# Patient Record
Sex: MALE | Race: WHITE | NOT HISPANIC OR LATINO | Employment: OTHER | URBAN - METROPOLITAN AREA
[De-identification: names, ages, dates, MRNs, and addresses within clinical notes are randomized per-mention and may not be internally consistent; named-entity substitution may affect disease eponyms.]

---

## 2017-02-09 DIAGNOSIS — I10 ESSENTIAL HYPERTENSION: ICD-10-CM

## 2017-02-09 RX ORDER — HYDROCHLOROTHIAZIDE 25 MG/1
TABLET ORAL
Qty: 90 TABLET | Refills: 1 | Status: SHIPPED | OUTPATIENT
Start: 2017-02-09 | End: 2017-05-19 | Stop reason: SDUPTHER

## 2017-05-15 RX ORDER — AMLODIPINE BESYLATE AND BENAZEPRIL HYDROCHLORIDE 10; 20 MG/1; MG/1
CAPSULE ORAL
Qty: 30 CAPSULE | Refills: 0 | OUTPATIENT
Start: 2017-05-15

## 2017-05-19 ENCOUNTER — OFFICE VISIT (OUTPATIENT)
Dept: FAMILY MEDICINE CLINIC | Facility: CLINIC | Age: 62
End: 2017-05-19

## 2017-05-19 VITALS
DIASTOLIC BLOOD PRESSURE: 80 MMHG | HEART RATE: 89 BPM | OXYGEN SATURATION: 96 % | TEMPERATURE: 99.2 F | BODY MASS INDEX: 35.25 KG/M2 | HEIGHT: 74 IN | WEIGHT: 274.7 LBS | SYSTOLIC BLOOD PRESSURE: 116 MMHG

## 2017-05-19 DIAGNOSIS — I10 ESSENTIAL HYPERTENSION: Primary | ICD-10-CM

## 2017-05-19 DIAGNOSIS — Z00.00 HEALTH CARE MAINTENANCE: ICD-10-CM

## 2017-05-19 PROCEDURE — 99213 OFFICE O/P EST LOW 20 MIN: CPT | Performed by: FAMILY MEDICINE

## 2017-05-19 RX ORDER — HYDROCHLOROTHIAZIDE 25 MG/1
25 TABLET ORAL DAILY
Qty: 90 TABLET | Refills: 1 | Status: SHIPPED | OUTPATIENT
Start: 2017-05-19 | End: 2017-12-13 | Stop reason: SDUPTHER

## 2017-05-19 RX ORDER — AMLODIPINE BESYLATE AND BENAZEPRIL HYDROCHLORIDE 10; 20 MG/1; MG/1
1 CAPSULE ORAL DAILY
Qty: 90 CAPSULE | Refills: 1 | Status: SHIPPED | OUTPATIENT
Start: 2017-05-19 | End: 2017-12-13 | Stop reason: SDUPTHER

## 2017-05-24 ENCOUNTER — RESULTS ENCOUNTER (OUTPATIENT)
Dept: FAMILY MEDICINE CLINIC | Facility: CLINIC | Age: 62
End: 2017-05-24

## 2017-05-24 DIAGNOSIS — I10 ESSENTIAL HYPERTENSION: ICD-10-CM

## 2017-05-24 DIAGNOSIS — Z00.00 HEALTH CARE MAINTENANCE: ICD-10-CM

## 2017-10-24 ENCOUNTER — OFFICE VISIT (OUTPATIENT)
Dept: FAMILY MEDICINE CLINIC | Facility: CLINIC | Age: 62
End: 2017-10-24

## 2017-10-24 VITALS
DIASTOLIC BLOOD PRESSURE: 78 MMHG | HEART RATE: 96 BPM | SYSTOLIC BLOOD PRESSURE: 132 MMHG | WEIGHT: 277.6 LBS | HEIGHT: 74 IN | BODY MASS INDEX: 35.63 KG/M2 | TEMPERATURE: 98 F | OXYGEN SATURATION: 96 %

## 2017-10-24 DIAGNOSIS — H69.82 EUSTACHIAN TUBE DYSFUNCTION, LEFT: Primary | ICD-10-CM

## 2017-10-24 DIAGNOSIS — I10 ESSENTIAL HYPERTENSION: ICD-10-CM

## 2017-10-24 DIAGNOSIS — Z00.00 HEALTH CARE MAINTENANCE: ICD-10-CM

## 2017-10-24 PROCEDURE — 99213 OFFICE O/P EST LOW 20 MIN: CPT | Performed by: FAMILY MEDICINE

## 2017-10-24 NOTE — PROGRESS NOTES
"Subjective   Daryl Decker is a 61 y.o. male.     Chief Complaint   Patient presents with   • Ear Fullness     x 3days pt did not want the flu shot        History of Present Illness      Left ear fullness and pressure for 3 days.  Some sinus symptoms but not much.  No fever.  No ear pain.  No dizziness.  Earlier today the ear pressure equalized and he feels better.  He thought it might of the wax.  He is using peroxide drops in his ears.  No help.    Hypertension.  He's taking his blood pressure medicine.  Not checking it.  Weight is up a little bit.  Busy at work.    The following portions of the patient's history were reviewed and updated as appropriate: allergies, current medications, past family history, past medical history, past social history, past surgical history and problem list.          Review of Systems   Constitutional: Negative for fever.   HENT: Positive for congestion and hearing loss.    Respiratory: Negative.        Objective   Blood pressure 132/78, pulse 96, temperature 98 °F (36.7 °C), temperature source Oral, height 74\" (188 cm), weight 277 lb 9.6 oz (126 kg), SpO2 96 %.  Physical Exam   Constitutional: No distress.   No acute distress.  Nontoxic.   HENT:   Right Ear: Tympanic membrane, external ear and ear canal normal.   Left Ear: Tympanic membrane and ear canal normal.   Nose: Nose normal.   Mouth/Throat: Oropharynx is clear and moist. No oropharyngeal exudate.   Just minimal inflammation of the left external canal from the hydrogen peroxide.  No cerumen.  Eardrums unremarkable.   Eyes: Conjunctivae are normal. Right eye exhibits no discharge. Left eye exhibits no discharge. No scleral icterus.   Pulmonary/Chest: No stridor. No respiratory distress.   No tachypnea   Lymphadenopathy:     He has no cervical adenopathy.   Skin: No rash noted.   Nursing note and vitals reviewed.      Assessment/Plan   Daryl was seen today for ear fullness.    Diagnoses and all orders for this " visit:    Eustachian tube dysfunction, left    Essential hypertension  -     CBC & Differential; Future  -     Comprehensive Metabolic Panel; Future  -     Lipid Panel; Future    Health care maintenance  -     CBC & Differential; Future  -     Comprehensive Metabolic Panel; Future  -     Lipid Panel; Future      Eustachian tube dysfunction.  Resolved.  He's going to [place] in one month.  Call with recurrent symptoms.  To consider Flonase nasal spray over-the-counter with recurrent ear popping or pressure.  With fever worsening symptoms please call.    Hypertension.  We will see him back within about a month or 2 for complete physical examination.  Lab work ordered.

## 2017-10-29 ENCOUNTER — RESULTS ENCOUNTER (OUTPATIENT)
Dept: FAMILY MEDICINE CLINIC | Facility: CLINIC | Age: 62
End: 2017-10-29

## 2017-10-29 DIAGNOSIS — I10 ESSENTIAL HYPERTENSION: ICD-10-CM

## 2017-10-29 DIAGNOSIS — Z00.00 HEALTH CARE MAINTENANCE: ICD-10-CM

## 2017-11-16 LAB
ALBUMIN SERPL-MCNC: 4.6 G/DL (ref 3.5–5.2)
ALBUMIN/GLOB SERPL: 1.4 G/DL
ALP SERPL-CCNC: 67 U/L (ref 39–117)
ALT SERPL-CCNC: 27 U/L (ref 1–41)
AST SERPL-CCNC: 18 U/L (ref 1–40)
BASOPHILS # BLD AUTO: 0.03 10*3/MM3 (ref 0–0.2)
BASOPHILS NFR BLD AUTO: 0.4 % (ref 0–1.5)
BILIRUB SERPL-MCNC: 0.5 MG/DL (ref 0.1–1.2)
BUN SERPL-MCNC: 13 MG/DL (ref 8–23)
BUN/CREAT SERPL: 14.4 (ref 7–25)
CALCIUM SERPL-MCNC: 9.9 MG/DL (ref 8.6–10.5)
CHLORIDE SERPL-SCNC: 98 MMOL/L (ref 98–107)
CHOLEST SERPL-MCNC: 187 MG/DL (ref 0–200)
CO2 SERPL-SCNC: 28.5 MMOL/L (ref 22–29)
CREAT SERPL-MCNC: 0.9 MG/DL (ref 0.76–1.27)
EOSINOPHIL # BLD AUTO: 0.28 10*3/MM3 (ref 0–0.7)
EOSINOPHIL NFR BLD AUTO: 4 % (ref 0.3–6.2)
ERYTHROCYTE [DISTWIDTH] IN BLOOD BY AUTOMATED COUNT: 12.9 % (ref 11.5–14.5)
GFR SERPLBLD CREATININE-BSD FMLA CKD-EPI: 104 ML/MIN/1.73
GFR SERPLBLD CREATININE-BSD FMLA CKD-EPI: 86 ML/MIN/1.73
GLOBULIN SER CALC-MCNC: 3.2 GM/DL
GLUCOSE SERPL-MCNC: 91 MG/DL (ref 65–99)
HCT VFR BLD AUTO: 47.9 % (ref 40.4–52.2)
HDLC SERPL-MCNC: 42 MG/DL (ref 40–60)
HGB BLD-MCNC: 15.8 G/DL (ref 13.7–17.6)
IMM GRANULOCYTES # BLD: 0 10*3/MM3 (ref 0–0.03)
IMM GRANULOCYTES NFR BLD: 0 % (ref 0–0.5)
LDLC SERPL CALC-MCNC: 115 MG/DL (ref 0–100)
LYMPHOCYTES # BLD AUTO: 2.17 10*3/MM3 (ref 0.9–4.8)
LYMPHOCYTES NFR BLD AUTO: 31 % (ref 19.6–45.3)
MCH RBC QN AUTO: 32.1 PG (ref 27–32.7)
MCHC RBC AUTO-ENTMCNC: 33 G/DL (ref 32.6–36.4)
MCV RBC AUTO: 97.4 FL (ref 79.8–96.2)
MONOCYTES # BLD AUTO: 0.5 10*3/MM3 (ref 0.2–1.2)
MONOCYTES NFR BLD AUTO: 7.1 % (ref 5–12)
NEUTROPHILS # BLD AUTO: 4.02 10*3/MM3 (ref 1.9–8.1)
NEUTROPHILS NFR BLD AUTO: 57.5 % (ref 42.7–76)
PLATELET # BLD AUTO: 256 10*3/MM3 (ref 140–500)
POTASSIUM SERPL-SCNC: 4.3 MMOL/L (ref 3.5–5.2)
PROT SERPL-MCNC: 7.8 G/DL (ref 6–8.5)
RBC # BLD AUTO: 4.92 10*6/MM3 (ref 4.6–6)
SODIUM SERPL-SCNC: 141 MMOL/L (ref 136–145)
TRIGL SERPL-MCNC: 152 MG/DL (ref 0–150)
VLDLC SERPL CALC-MCNC: 30.4 MG/DL (ref 5–40)
WBC # BLD AUTO: 7 10*3/MM3 (ref 4.5–10.7)

## 2017-12-13 DIAGNOSIS — I10 ESSENTIAL HYPERTENSION: ICD-10-CM

## 2017-12-13 RX ORDER — AMLODIPINE BESYLATE AND BENAZEPRIL HYDROCHLORIDE 10; 20 MG/1; MG/1
CAPSULE ORAL
Qty: 90 CAPSULE | Refills: 1 | Status: SHIPPED | OUTPATIENT
Start: 2017-12-13 | End: 2018-06-22 | Stop reason: SDUPTHER

## 2017-12-13 RX ORDER — HYDROCHLOROTHIAZIDE 25 MG/1
TABLET ORAL
Qty: 90 TABLET | Refills: 1 | Status: SHIPPED | OUTPATIENT
Start: 2017-12-13 | End: 2018-06-22 | Stop reason: SDUPTHER

## 2017-12-18 ENCOUNTER — OFFICE VISIT (OUTPATIENT)
Dept: FAMILY MEDICINE CLINIC | Facility: CLINIC | Age: 62
End: 2017-12-18

## 2017-12-18 VITALS
TEMPERATURE: 98.2 F | HEIGHT: 74 IN | DIASTOLIC BLOOD PRESSURE: 72 MMHG | OXYGEN SATURATION: 95 % | WEIGHT: 269.9 LBS | SYSTOLIC BLOOD PRESSURE: 114 MMHG | BODY MASS INDEX: 34.64 KG/M2 | HEART RATE: 80 BPM

## 2017-12-18 DIAGNOSIS — E78.00 PURE HYPERCHOLESTEROLEMIA: ICD-10-CM

## 2017-12-18 DIAGNOSIS — K42.9 UMBILICAL HERNIA WITHOUT OBSTRUCTION AND WITHOUT GANGRENE: ICD-10-CM

## 2017-12-18 DIAGNOSIS — Z00.00 HEALTH CARE MAINTENANCE: Primary | ICD-10-CM

## 2017-12-18 DIAGNOSIS — I10 ESSENTIAL HYPERTENSION: ICD-10-CM

## 2017-12-18 PROBLEM — Z85.46 HISTORY OF PROSTATE CANCER: Status: ACTIVE | Noted: 2017-12-18

## 2017-12-18 PROCEDURE — 99396 PREV VISIT EST AGE 40-64: CPT | Performed by: FAMILY MEDICINE

## 2017-12-18 PROCEDURE — 90715 TDAP VACCINE 7 YRS/> IM: CPT | Performed by: FAMILY MEDICINE

## 2017-12-18 PROCEDURE — 90471 IMMUNIZATION ADMIN: CPT | Performed by: FAMILY MEDICINE

## 2017-12-18 NOTE — PROGRESS NOTES
Subjective   Daryl Decker is a 62 y.o. male.     Chief Complaint   Patient presents with   • Annual Exam     follow up labs   • URI     x nov 28        History of Present Illness     Hyperlipidemia. Total cholesterol is low but the 10 year risk based on 2013 guidelines of coronary vascular disease is 11%.  Patient has not been exercising as much.  Busy with tach season coming up.    Last lipid panel:   Lab Results   Component Value Date    HDL 42 11/15/2017     Lab Results   Component Value Date     (H) 11/15/2017     Lab Results   Component Value Date    TRIG 152 (H) 11/15/2017     Hypertension follow up. Doing well with current medication which he is taking as directed. No known high or low blood pressure episodes. No cardiovascular or neurological symptoms. Today's BP: 114/72 .    He has noticed some URI symptoms.  Went to an urgent care center few weeks go.  Overall it's much better.  He still has some sinus drainage but no fever.    Questionable bellybutton hernia.  He noticed something pop up about 3 or 4 months ago.  Not painful.  But noticeable.    Social History   Substance Use Topics   • Smoking status: Never Smoker   • Smokeless tobacco: Never Used   • Alcohol use Yes      Comment: occ     Family History   Problem Relation Age of Onset   • Cancer Mother    • Dementia Mother    • Thyroid disease Mother    • Cancer Father    • Aneurysm Father      femoral  artery.... and his brother   • Aortic aneurysm Paternal Grandmother      Ruptured     Immunization History   Administered Date(s) Administered   • Influenza, Quadrivalent 12/17/2015   • Tdap 12/18/2017     PHQ-2 Depression Screening  Little interest or pleasure in doing things? 0   Feeling down, depressed, or hopeless? 0   PHQ-2 Total Score 0           The following portions of the patient's history were reviewed and updated as appropriate: allergies, current medications, past family history, past medical history, past social history, past  "surgical history and problem list.          Review of Systems   Constitutional: Negative.    HENT: Negative.    Respiratory: Negative.    Cardiovascular: Negative.    Gastrointestinal: Positive for abdominal distention. Negative for abdominal pain.   Endocrine: Negative.    Genitourinary: Negative.    Musculoskeletal: Negative.    Skin: Negative.    Neurological: Negative.  Negative for headaches.   Psychiatric/Behavioral: Negative.    All other systems reviewed and are negative.      Objective   Blood pressure 114/72, pulse 80, temperature 98.2 °F (36.8 °C), temperature source Oral, height 188 cm (74.02\"), weight 122 kg (269 lb 14.4 oz), SpO2 95 %.  Physical Exam   Constitutional: He appears well-developed and well-nourished. No distress.   No acute distress.  Nontoxic.   HENT:   Right Ear: Tympanic membrane, external ear and ear canal normal.   Left Ear: Tympanic membrane, external ear and ear canal normal.   Nose: Nose normal.   Mouth/Throat: Oropharynx is clear and moist. No oropharyngeal exudate.   Eyes: Conjunctivae are normal. Right eye exhibits no discharge. Left eye exhibits no discharge. No scleral icterus.   Neck: No thyromegaly present.   Cardiovascular: Normal rate, regular rhythm, normal heart sounds and intact distal pulses.    Pulmonary/Chest: Effort normal and breath sounds normal. No stridor. No respiratory distress. He has no wheezes. He has no rales.   No tachypnea   Abdominal: Soft. Bowel sounds are normal. He exhibits no distension and no mass. There is no tenderness. There is no rebound and no guarding. A hernia is present.   Small to moderate sized palpable, reducible umbilical hernia.  Nontender.     Musculoskeletal: He exhibits no edema.   Lymphadenopathy:     He has no cervical adenopathy.   Skin: Skin is warm and dry. No rash noted.   Psychiatric: He has a normal mood and affect. His behavior is normal. Judgment and thought content normal.   Nursing note and vitals " reviewed.      Assessment/Plan   Daryl was seen today for annual exam and uri.    Diagnoses and all orders for this visit:    Health care maintenance    Essential hypertension    Umbilical hernia without obstruction and without gangrene  -     Ambulatory Referral to General Surgery    Pure hypercholesterolemia  -     Comprehensive Metabolic Panel; Future  -     Lipid Panel; Future    Other orders  -     aspirin 81 MG tablet; Take 1 tablet by mouth Daily.  -     Tdap Vaccine Greater Than or Equal To 8yo IM      Hyperlipidemia.  10 year risk of adverse chronic coronary vascular disease based on 2013 guidelines is 11%.  Total cholesterol is very low.  Mostly his issue is low HDL.  Recommend increase exercise.  Will reassess in 6 months to next    Hypertension.  Stable.    Here for complete physical examination today.    Immunizations.  TdaP today.    Umbilical hernia.  Referral for evaluation with general surgery.    Recommend low-dose aspirin day.    Prostate cancer screening not needed, history of prostate cancer followed by urology.  Next    Colonoscopy up-to-date.

## 2017-12-23 ENCOUNTER — RESULTS ENCOUNTER (OUTPATIENT)
Dept: FAMILY MEDICINE CLINIC | Facility: CLINIC | Age: 62
End: 2017-12-23

## 2017-12-23 DIAGNOSIS — E78.00 PURE HYPERCHOLESTEROLEMIA: ICD-10-CM

## 2018-01-15 ENCOUNTER — OFFICE VISIT (OUTPATIENT)
Dept: SURGERY | Facility: CLINIC | Age: 63
End: 2018-01-15

## 2018-01-15 VITALS — HEART RATE: 79 BPM | WEIGHT: 275 LBS | HEIGHT: 74 IN | OXYGEN SATURATION: 99 % | BODY MASS INDEX: 35.29 KG/M2

## 2018-01-15 DIAGNOSIS — K42.9 UMBILICAL HERNIA WITHOUT OBSTRUCTION AND WITHOUT GANGRENE: Primary | ICD-10-CM

## 2018-01-15 PROCEDURE — 99242 OFF/OP CONSLTJ NEW/EST SF 20: CPT | Performed by: SURGERY

## 2018-01-15 RX ORDER — SILDENAFIL CITRATE 20 MG/1
20 TABLET ORAL 3 TIMES DAILY
COMMUNITY
End: 2022-07-12

## 2018-05-16 LAB
ALBUMIN SERPL-MCNC: 4.5 G/DL (ref 3.5–5.2)
ALBUMIN/GLOB SERPL: 1.5 G/DL
ALP SERPL-CCNC: 71 U/L (ref 39–117)
ALT SERPL-CCNC: 24 U/L (ref 1–41)
AST SERPL-CCNC: 17 U/L (ref 1–40)
BILIRUB SERPL-MCNC: 0.6 MG/DL (ref 0.1–1.2)
BUN SERPL-MCNC: 14 MG/DL (ref 8–23)
BUN/CREAT SERPL: 12.5 (ref 7–25)
CALCIUM SERPL-MCNC: 9.5 MG/DL (ref 8.6–10.5)
CHLORIDE SERPL-SCNC: 99 MMOL/L (ref 98–107)
CHOLEST SERPL-MCNC: 188 MG/DL (ref 0–200)
CO2 SERPL-SCNC: 28.1 MMOL/L (ref 22–29)
CREAT SERPL-MCNC: 1.12 MG/DL (ref 0.76–1.27)
GFR SERPLBLD CREATININE-BSD FMLA CKD-EPI: 66 ML/MIN/1.73
GFR SERPLBLD CREATININE-BSD FMLA CKD-EPI: 80 ML/MIN/1.73
GLOBULIN SER CALC-MCNC: 3.1 GM/DL
GLUCOSE SERPL-MCNC: 130 MG/DL (ref 65–99)
HDLC SERPL-MCNC: 38 MG/DL (ref 40–60)
LDLC SERPL CALC-MCNC: 119 MG/DL (ref 0–100)
POTASSIUM SERPL-SCNC: 4.4 MMOL/L (ref 3.5–5.2)
PROT SERPL-MCNC: 7.6 G/DL (ref 6–8.5)
SODIUM SERPL-SCNC: 139 MMOL/L (ref 136–145)
TRIGL SERPL-MCNC: 157 MG/DL (ref 0–150)
VLDLC SERPL CALC-MCNC: 31.4 MG/DL (ref 5–40)

## 2018-05-21 ENCOUNTER — OFFICE VISIT (OUTPATIENT)
Dept: FAMILY MEDICINE CLINIC | Facility: CLINIC | Age: 63
End: 2018-05-21

## 2018-05-21 VITALS
HEIGHT: 74 IN | HEART RATE: 93 BPM | WEIGHT: 278 LBS | DIASTOLIC BLOOD PRESSURE: 62 MMHG | SYSTOLIC BLOOD PRESSURE: 110 MMHG | BODY MASS INDEX: 35.68 KG/M2 | TEMPERATURE: 98.5 F | OXYGEN SATURATION: 95 %

## 2018-05-21 DIAGNOSIS — E78.00 PURE HYPERCHOLESTEROLEMIA: ICD-10-CM

## 2018-05-21 DIAGNOSIS — R73.01 IMPAIRED FASTING GLUCOSE: ICD-10-CM

## 2018-05-21 DIAGNOSIS — I10 ESSENTIAL HYPERTENSION: Primary | ICD-10-CM

## 2018-05-21 PROCEDURE — 99213 OFFICE O/P EST LOW 20 MIN: CPT | Performed by: FAMILY MEDICINE

## 2018-05-21 NOTE — PROGRESS NOTES
"Subjective   Daryl Decker is a 62 y.o. male.     Hypertension (Follow up labs) and Knee Pain (twisted right knee, talk about shingles and hep a vaccine)    History of Present Illness    Hypertension follow up. Doing well with current medication which he is taking as directed. No known high or low blood pressure episodes. No cardiovascular or neurological symptoms. Today's BP: 110/62.      Hyperlipidemia follow up.  10 year risk of atherosclerotic coronary vascular disease based on 2013 guidelines is still 11%.  Patient states he is in the process of losing weight.  He states he's lost 9 pounds in the last 20 days her diet and exercise.  He wants to hold off cholesterol medication.  He is concerned about long-term side effects.    Last lipid panel:   Lab Results   Component Value Date    HDL 38 (L) 05/16/2018     Lab Results   Component Value Date     (H) 05/16/2018     Lab Results   Component Value Date    TRIG 157 (H) 05/16/2018     Impaired fasting glucose.  Previously normal.  Now 130.  He states he has been losing weight as above.    The following portions of the patient's history were reviewed and updated as appropriate: allergies, current medications, past family history, past medical history, past social history, past surgical history and problem list.      Review of Systems   Constitutional: Negative.    Respiratory: Negative.    Cardiovascular: Negative.    Musculoskeletal: Negative.        Objective   Blood pressure 110/62, pulse 93, temperature 98.5 °F (36.9 °C), temperature source Oral, height 188 cm (74.02\"), weight 126 kg (278 lb), SpO2 95 %.  Physical Exam   Constitutional: He appears well-developed and well-nourished. No distress.   Neck: No thyromegaly present.   Cardiovascular: Normal rate, regular rhythm, normal heart sounds and intact distal pulses.    Pulmonary/Chest: Effort normal and breath sounds normal.   Musculoskeletal: He exhibits no edema.   Skin: Skin is warm and dry. "   Psychiatric: He has a normal mood and affect. His behavior is normal. Judgment and thought content normal.   Nursing note and vitals reviewed.      Assessment/Plan   Daryl was seen today for hypertension and knee pain.    Diagnoses and all orders for this visit:    Essential hypertension    Pure hypercholesterolemia  -     Comprehensive Metabolic Panel; Future  -     Lipid Panel; Future    Impaired fasting glucose  -     Hemoglobin A1c; Future      Hypertension.  Well-controlled.  Continue current medication.      Hyperlipidemia.  10 year risk continues to be 11%.  Patient is reluctant to start statin medication.  We need to improve the HDL LDL ratio.  He states he's going to lose weight.  Will check lipid panel prior to next visit in 6 months we see him back for complete physical examination.    Impaired fasting glucose.  Check A1c and fasting goals prior to next visit.  Diet and exercise discussed.

## 2018-06-22 DIAGNOSIS — I10 ESSENTIAL HYPERTENSION: ICD-10-CM

## 2018-06-22 RX ORDER — AMLODIPINE BESYLATE AND BENAZEPRIL HYDROCHLORIDE 10; 20 MG/1; MG/1
CAPSULE ORAL
Qty: 90 CAPSULE | Refills: 1 | Status: SHIPPED | OUTPATIENT
Start: 2018-06-22 | End: 2019-03-31 | Stop reason: SDUPTHER

## 2018-06-22 RX ORDER — HYDROCHLOROTHIAZIDE 25 MG/1
TABLET ORAL
Qty: 90 TABLET | Refills: 1 | Status: SHIPPED | OUTPATIENT
Start: 2018-06-22 | End: 2019-03-31 | Stop reason: SDUPTHER

## 2018-07-06 ENCOUNTER — OFFICE VISIT (OUTPATIENT)
Dept: FAMILY MEDICINE CLINIC | Facility: CLINIC | Age: 63
End: 2018-07-06

## 2018-07-06 VITALS
DIASTOLIC BLOOD PRESSURE: 62 MMHG | TEMPERATURE: 98.3 F | BODY MASS INDEX: 33.5 KG/M2 | OXYGEN SATURATION: 96 % | HEIGHT: 74 IN | SYSTOLIC BLOOD PRESSURE: 92 MMHG | WEIGHT: 261 LBS | HEART RATE: 65 BPM

## 2018-07-06 DIAGNOSIS — A09 TRAVELER'S DIARRHEA: Primary | ICD-10-CM

## 2018-07-06 PROCEDURE — 99213 OFFICE O/P EST LOW 20 MIN: CPT | Performed by: FAMILY MEDICINE

## 2018-07-06 RX ORDER — CIPROFLOXACIN 500 MG/1
500 TABLET, FILM COATED ORAL 2 TIMES DAILY
Qty: 6 TABLET | Refills: 0 | Status: SHIPPED | OUTPATIENT
Start: 2018-07-06 | End: 2019-03-25

## 2018-07-06 NOTE — PROGRESS NOTES
"Subjective   Daryl Decker is a 62 y.o. male.     Chief Complaint   Patient presents with   • Diarrhea     x mon  from his trip    • Abdominal Cramping        History of Present Illness    In [facility] earlier this week.  Got sick Monday.  8 some mangoes which he washed but also he ate the skin.  That evening had frequent diarrhea all night long.  Loose and watery.  No blood.  Some abdominal cramping.  Took Imodium and Pepto-Bismol which seemed to help.  No vomiting or diarrhea.  No constitutional symptoms but did wake up Wednesday morning in a sweat.  Since then the diarrhea has diminished.  He is drinking plenty of water.  He's having some mild cramping but that's improving.  Still no sick contacts.  No rash.  No bleeding.      The following portions of the patient's history were reviewed and updated as appropriate: allergies, current medications, past family history, past medical history, past social history, past surgical history and problem list.          Review of Systems   Constitutional: Negative for fatigue and fever.   Gastrointestinal: Positive for abdominal pain and diarrhea. Negative for blood in stool, nausea and vomiting.   Genitourinary: Negative.    Skin: Negative.    Neurological: Negative for light-headedness.       Objective   Blood pressure 92/62, pulse 65, temperature 98.3 °F (36.8 °C), temperature source Oral, height 188 cm (74.02\"), weight 118 kg (261 lb), SpO2 96 %.  Physical Exam   Constitutional: He is oriented to person, place, and time. No distress.   HENT:   Head: Atraumatic.   Mouth/Throat: Oropharynx is clear and moist.   Eyes: Conjunctivae are normal.   Neck: Normal range of motion. Neck supple.   Cardiovascular: Normal rate and regular rhythm.    No tachycardia   Pulmonary/Chest: Effort normal.   Abdominal: Soft. Bowel sounds are normal. He exhibits no distension and no mass. There is tenderness. There is no rebound and no guarding. No hernia.   Mild right upper quadrant tenderness, " not over the gallbladder.  No organomegaly   Neurological: He is alert and oriented to person, place, and time. He exhibits normal muscle tone.   Skin: Skin is warm and dry. No rash noted. He is not diaphoretic.   Psychiatric: He has a normal mood and affect.       Assessment/Plan   Daryl was seen today for diarrhea and abdominal cramping.    Diagnoses and all orders for this visit:    Traveler's diarrhea    Other orders  -     ciprofloxacin (CIPRO) 500 MG tablet; Take 1 tablet by mouth 2 (Two) Times a Day. For traveler's diarrhea      Travelers diarrhea.  Resolving.  Likely from the cholo skin..  Recommend continuing hydration.  He continues the Imodium and the Pepto-Bismol as needed.  With worsening symptoms this weekend, Cipro 500 mg twice day for 3 days.  If symptoms persist into next week, recommend stool studies including culture, ova and parasite, and Giardia antigen.

## 2018-07-09 ENCOUNTER — TELEPHONE (OUTPATIENT)
Dept: FAMILY MEDICINE CLINIC | Facility: CLINIC | Age: 63
End: 2018-07-09

## 2018-07-09 NOTE — TELEPHONE ENCOUNTER
Pt is calling saying that he did not take the Cipro due to not having very many bowel movents.  But now the patient stated that he is having blood in his stool. And he will get cramping right before he has a bowel movement that it is lose stools. No fever or nausea with this. He is wanting to know if you would like him to come in or what the next plan you would like for him to do? Should he take the Cipro?

## 2018-07-11 ENCOUNTER — TELEPHONE (OUTPATIENT)
Dept: FAMILY MEDICINE CLINIC | Facility: CLINIC | Age: 63
End: 2018-07-11

## 2018-07-11 NOTE — TELEPHONE ENCOUNTER
Pt is calling he is on day 3 now of the Cipro. He said that pain is now gone. He said that he still has very lose stool. But it now only comes once a day. He is wondering if you would like to continue the medication or to do something else. Please advise

## 2018-07-11 NOTE — TELEPHONE ENCOUNTER
3 days should be enough.  Hold off further evaluation for now.  Loose stool may continue for a few days but should stop.

## 2018-08-19 ENCOUNTER — RESULTS ENCOUNTER (OUTPATIENT)
Dept: FAMILY MEDICINE CLINIC | Facility: CLINIC | Age: 63
End: 2018-08-19

## 2018-08-19 DIAGNOSIS — R73.01 IMPAIRED FASTING GLUCOSE: ICD-10-CM

## 2018-08-19 DIAGNOSIS — E78.00 PURE HYPERCHOLESTEROLEMIA: ICD-10-CM

## 2019-03-19 LAB
ALBUMIN SERPL-MCNC: 4.3 G/DL (ref 3.5–5.2)
ALBUMIN/GLOB SERPL: 1.5 G/DL
ALP SERPL-CCNC: 56 U/L (ref 39–117)
ALT SERPL-CCNC: 23 U/L (ref 1–41)
AST SERPL-CCNC: 15 U/L (ref 1–40)
BILIRUB SERPL-MCNC: 0.5 MG/DL (ref 0.2–1.2)
BUN SERPL-MCNC: 12 MG/DL (ref 8–23)
BUN/CREAT SERPL: 13.8 (ref 7–25)
CALCIUM SERPL-MCNC: 9.6 MG/DL (ref 8.6–10.5)
CHLORIDE SERPL-SCNC: 103 MMOL/L (ref 98–107)
CHOLEST SERPL-MCNC: 175 MG/DL (ref 0–200)
CO2 SERPL-SCNC: 28.5 MMOL/L (ref 22–29)
CREAT SERPL-MCNC: 0.87 MG/DL (ref 0.76–1.27)
GLOBULIN SER CALC-MCNC: 2.8 GM/DL
GLUCOSE SERPL-MCNC: 117 MG/DL (ref 65–99)
HBA1C MFR BLD: 5.99 % (ref 4.8–5.6)
HDLC SERPL-MCNC: 38 MG/DL (ref 40–60)
LDLC SERPL CALC-MCNC: 102 MG/DL (ref 0–100)
POTASSIUM SERPL-SCNC: 4.7 MMOL/L (ref 3.5–5.2)
PROT SERPL-MCNC: 7.1 G/DL (ref 6–8.5)
SODIUM SERPL-SCNC: 143 MMOL/L (ref 136–145)
TRIGL SERPL-MCNC: 175 MG/DL (ref 0–150)
VLDLC SERPL CALC-MCNC: 35 MG/DL (ref 5–40)

## 2019-03-25 ENCOUNTER — OFFICE VISIT (OUTPATIENT)
Dept: FAMILY MEDICINE CLINIC | Facility: CLINIC | Age: 64
End: 2019-03-25

## 2019-03-25 VITALS
RESPIRATION RATE: 16 BRPM | TEMPERATURE: 98.3 F | HEIGHT: 74 IN | DIASTOLIC BLOOD PRESSURE: 88 MMHG | SYSTOLIC BLOOD PRESSURE: 130 MMHG | OXYGEN SATURATION: 98 % | HEART RATE: 77 BPM | WEIGHT: 268 LBS | BODY MASS INDEX: 34.39 KG/M2

## 2019-03-25 DIAGNOSIS — Z00.00 HEALTH CARE MAINTENANCE: Primary | ICD-10-CM

## 2019-03-25 DIAGNOSIS — I10 ESSENTIAL HYPERTENSION: ICD-10-CM

## 2019-03-25 DIAGNOSIS — R73.01 IMPAIRED FASTING GLUCOSE: ICD-10-CM

## 2019-03-25 PROCEDURE — 90732 PPSV23 VACC 2 YRS+ SUBQ/IM: CPT | Performed by: FAMILY MEDICINE

## 2019-03-25 PROCEDURE — 90471 IMMUNIZATION ADMIN: CPT | Performed by: FAMILY MEDICINE

## 2019-03-25 PROCEDURE — 99396 PREV VISIT EST AGE 40-64: CPT | Performed by: FAMILY MEDICINE

## 2019-03-25 NOTE — PROGRESS NOTES
Subjective   Daryl Decker is a 63 y.o. male.     Annual Exam (physical)    History of Present Illness    Hypertension follow up. Doing well with current medication which he is taking as directed. No known high or low blood pressure episodes. No cardiovascular or neurological symptoms. Today's BP: 130/88.      Hyperlipidemia follow up. No statin use.  10-year risk of atherosclerotic coronary vascular disease based on ACC guidelines is 15%.  Predominantly low HDL.  Also has impaired fasting glucose.  A1c 5.9% up from last check.  However fasting glucose 117, maximum 130 x 1.  No criteria for diabetes type 2.    He is gained some weight.  He is on exercising much.  New job.  Busy.    Last lipid panel:   Lab Results   Component Value Date    HDL 38 (L) 03/18/2019     Lab Results   Component Value Date     (H) 03/18/2019     Lab Results   Component Value Date    TRIG 175 (H) 03/18/2019     Social History     Tobacco Use   • Smoking status: Never Smoker   • Smokeless tobacco: Never Used   Substance Use Topics   • Alcohol use: Yes     Comment: occ   • Drug use: No     Immunization History   Administered Date(s) Administered   • Flu Mist 12/17/2015   • Pneumococcal Polysaccharide (PPSV23) 03/25/2019   • Tdap 12/18/2017     Family History   Problem Relation Age of Onset   • Cancer Mother    • Dementia Mother    • Thyroid disease Mother    • Cancer Father    • Aneurysm Father         femoral  artery.... and his brother   • Aortic aneurysm Paternal Grandmother         Ruptured       The following portions of the patient's history were reviewed and updated as appropriate: allergies, current medications, past family history, past medical history, past social history, past surgical history and problem list.      Review of Systems   Constitutional: Negative.    HENT: Negative.    Respiratory: Negative.    Cardiovascular: Negative.    Gastrointestinal: Negative.    Endocrine: Negative.    Genitourinary: Negative.   "  Musculoskeletal: Negative.    Skin: Negative.    Neurological: Negative.    Psychiatric/Behavioral: Negative.    All other systems reviewed and are negative.      Objective   Blood pressure 130/88, pulse 77, temperature 98.3 °F (36.8 °C), temperature source Oral, resp. rate 16, height 188 cm (74.02\"), weight 122 kg (268 lb), SpO2 98 %.  Physical Exam   Constitutional: He is oriented to person, place, and time. He appears well-developed and well-nourished. No distress.   HENT:   Head: Atraumatic.   Right Ear: Tympanic membrane, external ear and ear canal normal.   Left Ear: Tympanic membrane, external ear and ear canal normal.   Nose: Nose normal.   Mouth/Throat: Oropharynx is clear and moist. No oropharyngeal exudate.   Eyes: Conjunctivae are normal. Right eye exhibits no discharge. Left eye exhibits no discharge. No scleral icterus.   Neck: Normal range of motion. Neck supple. No thyromegaly present.   Cardiovascular: Normal rate, regular rhythm, normal heart sounds and intact distal pulses.   Pulmonary/Chest: Effort normal and breath sounds normal. No respiratory distress. He has no wheezes. He has no rales.   Abdominal: Soft. Bowel sounds are normal. He exhibits no distension. There is no tenderness. A hernia ( Very small buckle hernia.  A symptomatic) is present.   Musculoskeletal: He exhibits no edema.   Lymphadenopathy:     He has no cervical adenopathy.   Neurological: He is alert and oriented to person, place, and time. He exhibits normal muscle tone. Coordination normal.   Skin: Skin is warm and dry. No rash noted.   Psychiatric: He has a normal mood and affect. His behavior is normal. Judgment and thought content normal.   Nursing note and vitals reviewed.      Assessment/Plan   Daryl was seen today for annual exam.    Diagnoses and all orders for this visit:    Health care maintenance    Essential hypertension    Impaired fasting glucose    Other orders  -     Pneumococcal Polysaccharide Vaccine " 23-Valent Greater Than or Equal To 1yo Subcutaneous / IM      Annual wellness visit.    Immunizations.  Pneumovax today.  Prevnar 1 year.  Recommend Shingrix at retail pharmacy.    Hypertension.  Overall good control.    Impaired fasting glucose.  Check A1c and other lab work prior to next visit.    Hyperlipidemia.  10-year risk of vascular chronic coronary vascular disease of 15%.  We discussed the pros and cons of statin use.  This time holding off on statin therapy.  I recommend increase exercise and decrease carbohydrates with weight loss.    Prostate cancer history.  Followed by urology.  Doing well according to their notes.    Colon cancer screening due 2022.

## 2019-03-31 DIAGNOSIS — I10 ESSENTIAL HYPERTENSION: ICD-10-CM

## 2019-04-01 RX ORDER — AMLODIPINE BESYLATE AND BENAZEPRIL HYDROCHLORIDE 10; 20 MG/1; MG/1
CAPSULE ORAL
Qty: 90 CAPSULE | Refills: 1 | Status: SHIPPED | OUTPATIENT
Start: 2019-04-01 | End: 2019-10-11 | Stop reason: SDUPTHER

## 2019-04-01 RX ORDER — HYDROCHLOROTHIAZIDE 25 MG/1
TABLET ORAL
Qty: 90 TABLET | Refills: 1 | Status: SHIPPED | OUTPATIENT
Start: 2019-04-01 | End: 2019-10-11 | Stop reason: SDUPTHER

## 2019-06-23 ENCOUNTER — RESULTS ENCOUNTER (OUTPATIENT)
Dept: FAMILY MEDICINE CLINIC | Facility: CLINIC | Age: 64
End: 2019-06-23

## 2019-06-23 DIAGNOSIS — I10 ESSENTIAL HYPERTENSION: ICD-10-CM

## 2019-06-23 DIAGNOSIS — R73.01 IMPAIRED FASTING GLUCOSE: ICD-10-CM

## 2019-10-11 DIAGNOSIS — I10 ESSENTIAL HYPERTENSION: ICD-10-CM

## 2019-10-11 RX ORDER — AMLODIPINE BESYLATE AND BENAZEPRIL HYDROCHLORIDE 10; 20 MG/1; MG/1
CAPSULE ORAL
Qty: 90 CAPSULE | Refills: 1 | Status: SHIPPED | OUTPATIENT
Start: 2019-10-11 | End: 2020-04-09 | Stop reason: SDUPTHER

## 2019-10-11 RX ORDER — HYDROCHLOROTHIAZIDE 25 MG/1
TABLET ORAL
Qty: 90 TABLET | Refills: 1 | Status: SHIPPED | OUTPATIENT
Start: 2019-10-11 | End: 2020-04-09 | Stop reason: SDUPTHER

## 2019-11-07 LAB
ALBUMIN SERPL-MCNC: 4.7 G/DL (ref 3.5–5.2)
ALBUMIN/GLOB SERPL: 1.7 G/DL
ALP SERPL-CCNC: 67 U/L (ref 39–117)
ALT SERPL-CCNC: 23 U/L (ref 1–41)
AST SERPL-CCNC: 19 U/L (ref 1–40)
BILIRUB SERPL-MCNC: 0.7 MG/DL (ref 0.2–1.2)
BUN SERPL-MCNC: 12 MG/DL (ref 8–23)
BUN/CREAT SERPL: 10.9 (ref 7–25)
CALCIUM SERPL-MCNC: 9.5 MG/DL (ref 8.6–10.5)
CHLORIDE SERPL-SCNC: 99 MMOL/L (ref 98–107)
CHOLEST SERPL-MCNC: 175 MG/DL (ref 0–200)
CO2 SERPL-SCNC: 27.5 MMOL/L (ref 22–29)
CREAT SERPL-MCNC: 1.1 MG/DL (ref 0.76–1.27)
GLOBULIN SER CALC-MCNC: 2.8 GM/DL
GLUCOSE SERPL-MCNC: 111 MG/DL (ref 65–99)
HBA1C MFR BLD: 6.1 % (ref 4.8–5.6)
HDLC SERPL-MCNC: 34 MG/DL (ref 40–60)
LDLC SERPL CALC-MCNC: 98 MG/DL (ref 0–100)
POTASSIUM SERPL-SCNC: 4.4 MMOL/L (ref 3.5–5.2)
PROT SERPL-MCNC: 7.5 G/DL (ref 6–8.5)
SODIUM SERPL-SCNC: 140 MMOL/L (ref 136–145)
TRIGL SERPL-MCNC: 216 MG/DL (ref 0–150)
VLDLC SERPL CALC-MCNC: 43.2 MG/DL

## 2019-11-18 ENCOUNTER — OFFICE VISIT (OUTPATIENT)
Dept: FAMILY MEDICINE CLINIC | Facility: CLINIC | Age: 64
End: 2019-11-18

## 2019-11-18 VITALS
TEMPERATURE: 97.7 F | HEART RATE: 78 BPM | DIASTOLIC BLOOD PRESSURE: 78 MMHG | WEIGHT: 268.7 LBS | OXYGEN SATURATION: 97 % | HEIGHT: 74 IN | BODY MASS INDEX: 34.48 KG/M2 | SYSTOLIC BLOOD PRESSURE: 124 MMHG

## 2019-11-18 DIAGNOSIS — R73.01 IMPAIRED FASTING GLUCOSE: ICD-10-CM

## 2019-11-18 DIAGNOSIS — G62.9 PERIPHERAL POLYNEUROPATHY: ICD-10-CM

## 2019-11-18 DIAGNOSIS — Z23 NEED FOR IMMUNIZATION AGAINST INFLUENZA: ICD-10-CM

## 2019-11-18 DIAGNOSIS — E78.00 PURE HYPERCHOLESTEROLEMIA: ICD-10-CM

## 2019-11-18 DIAGNOSIS — I10 ESSENTIAL HYPERTENSION: Primary | ICD-10-CM

## 2019-11-18 PROCEDURE — 90674 CCIIV4 VAC NO PRSV 0.5 ML IM: CPT | Performed by: FAMILY MEDICINE

## 2019-11-18 PROCEDURE — 90471 IMMUNIZATION ADMIN: CPT | Performed by: FAMILY MEDICINE

## 2019-11-18 PROCEDURE — 99214 OFFICE O/P EST MOD 30 MIN: CPT | Performed by: FAMILY MEDICINE

## 2019-11-18 RX ORDER — METFORMIN HYDROCHLORIDE 500 MG/1
500 TABLET, EXTENDED RELEASE ORAL
Qty: 90 TABLET | Refills: 1 | Status: SHIPPED | OUTPATIENT
Start: 2019-11-18 | End: 2020-04-09 | Stop reason: SDUPTHER

## 2019-11-18 NOTE — PROGRESS NOTES
Subjective   Daryl Decker is a 63 y.o. male.     Chief Complaint   Patient presents with   • Hypertension     follow up labs    • Hyperlipidemia   • Numbness     in bilateral feet        History of Present Illness     Impaired fasting glucose.  Idiopathic neuropathy of lower extremities.  Previous numbness-like feelings in the lower extremity is been no pain.  However recently he states he can feel his right great toe very well and had a small blister he did not know about.  His A1c is up slightly to 6%.  Fasting glucose is improved.  He has not met criteria for diabetes.    Hypertension follow up. Doing well with current medication which he is taking as directed. No known high or low blood pressure episodes. No cardiovascular or neurological symptoms. Today's BP: 124/78 .    Hyperlipidemia follow up.  No statin use.  Low HDL.  Relatively low LDL.  Lab Results   Component Value Date    CHLPL 175 11/06/2019    TRIG 216 (H) 11/06/2019    HDL 34 (L) 11/06/2019    LDL 98 11/06/2019       The 10-year ASCVD risk score (Jose Maria STAFFORD Jr., et al., 2013) is: 14.8%    Values used to calculate the score:      Age: 63 years      Sex: Male      Is Non- : No      Diabetic: No      Tobacco smoker: No      Systolic Blood Pressure: 124 mmHg      Is BP treated: Yes      HDL Cholesterol: 34 mg/dL      Total Cholesterol: 175 mg/dL        The following portions of the patient's history were reviewed and updated as appropriate: allergies, current medications, past family history, past medical history, past social history, past surgical history and problem list.          Review of Systems   Constitutional: Negative.    Respiratory: Negative.    Cardiovascular: Negative.  Negative for chest pain.   Skin: Positive for wound.   Neurological: Positive for numbness.   Psychiatric/Behavioral: Negative.        Objective   Blood pressure 124/78, pulse 78, temperature 97.7 °F (36.5 °C), temperature source Oral, height 188 cm  "(74.02\"), weight 122 kg (268 lb 11.2 oz), SpO2 97 %.  Physical Exam   Constitutional: He appears well-developed and well-nourished. No distress.   Neck: No thyromegaly present.   Cardiovascular: Normal rate, regular rhythm, normal heart sounds and intact distal pulses.   Pulmonary/Chest: Effort normal and breath sounds normal.   Musculoskeletal: He exhibits no edema.   Right great toe reveals a small blister that is healed.  Minimal erythema.  No paronychia.  Noted at the cuticle area.    Daryl had a diabetic foot exam performed today.   During the foot exam he had a monofilament test performed.    Neurological Sensory Findings -  Altered sharp/dull right ankle/foot discrimination. Unaltered sharp/dull left ankle/foot discrimination (No sensation at the tip of the right great toe otherwise normal monofilament testing.).  Skin Integrity  -  His right foot skin is not intact.His left foot skin is intact..  Skin: Skin is warm and dry.   Psychiatric: He has a normal mood and affect. His behavior is normal. Judgment and thought content normal.   Nursing note and vitals reviewed.      Assessment/Plan   Daryl was seen today for hypertension, hyperlipidemia and numbness.    Diagnoses and all orders for this visit:    Essential hypertension    Impaired fasting glucose    Peripheral polyneuropathy  -     Ambulatory Referral to Neurology  -     Hemoglobin A1c; Future  -     Comprehensive Metabolic Panel; Future  -     Lipid Panel; Future    Other orders  -     metFORMIN ER (GLUCOPHAGE-XR) 500 MG 24 hr tablet; Take 1 tablet by mouth Daily With Breakfast.  -     mupirocin (BACTROBAN) 2 % ointment; Apply  topically to the appropriate area as directed 3 (Three) Times a Day.      Hypertension.  Controlled.    Impaired fasting glucose with peripheral polyneuropathy.  Likely related.  He does not meet criteria for diabetes.  I am recommending a neurology opinion.  He no longer can feel the right great toe.  He has a small blister " that is healing.  I am prescribing Bactroban ointment to the blister.  Recommend diet and exercise.  Starting metformin extended release 500 mg daily with side effects discussed including GI upset.  I will see him back in 3 or 4 months for recheck.    Hyperlipidemia.  Predominately low HDL and less than 100 LDL.  Statin likely not going to change things much.  I recommend increasing exercise and weight loss.

## 2020-02-16 ENCOUNTER — RESULTS ENCOUNTER (OUTPATIENT)
Dept: FAMILY MEDICINE CLINIC | Facility: CLINIC | Age: 65
End: 2020-02-16

## 2020-02-16 DIAGNOSIS — G62.9 PERIPHERAL POLYNEUROPATHY: ICD-10-CM

## 2020-04-09 ENCOUNTER — TELEPHONE (OUTPATIENT)
Dept: FAMILY MEDICINE CLINIC | Facility: CLINIC | Age: 65
End: 2020-04-09

## 2020-04-09 DIAGNOSIS — I10 ESSENTIAL HYPERTENSION: ICD-10-CM

## 2020-04-09 RX ORDER — METFORMIN HYDROCHLORIDE 500 MG/1
500 TABLET, EXTENDED RELEASE ORAL
Qty: 90 TABLET | Refills: 1 | Status: SHIPPED | OUTPATIENT
Start: 2020-04-09 | End: 2020-05-14

## 2020-04-09 RX ORDER — HYDROCHLOROTHIAZIDE 25 MG/1
25 TABLET ORAL DAILY
Qty: 90 TABLET | Refills: 1 | Status: SHIPPED | OUTPATIENT
Start: 2020-04-09 | End: 2020-05-14

## 2020-04-09 RX ORDER — AMLODIPINE BESYLATE AND BENAZEPRIL HYDROCHLORIDE 10; 20 MG/1; MG/1
1 CAPSULE ORAL DAILY
Qty: 90 CAPSULE | Refills: 1 | Status: SHIPPED | OUTPATIENT
Start: 2020-04-09 | End: 2020-05-14

## 2020-04-09 NOTE — TELEPHONE ENCOUNTER
Pt is switching pharmacies.    Pt is requesting a 90-day rx refill for the following meds:    Metformin ER 500mg  Amlodipine-benazepril 10-20mg  Hctz 25mg    [facility] #[phone].

## 2020-05-14 DIAGNOSIS — I10 ESSENTIAL HYPERTENSION: ICD-10-CM

## 2020-05-14 RX ORDER — HYDROCHLOROTHIAZIDE 25 MG/1
25 TABLET ORAL DAILY
Qty: 90 TABLET | Refills: 1 | Status: SHIPPED | OUTPATIENT
Start: 2020-05-14 | End: 2021-03-29

## 2020-05-14 RX ORDER — AMLODIPINE BESYLATE AND BENAZEPRIL HYDROCHLORIDE 10; 20 MG/1; MG/1
1 CAPSULE ORAL DAILY
Qty: 90 CAPSULE | Refills: 1 | Status: SHIPPED | OUTPATIENT
Start: 2020-05-14 | End: 2021-03-11

## 2020-05-14 RX ORDER — METFORMIN HYDROCHLORIDE 500 MG/1
TABLET, EXTENDED RELEASE ORAL
Qty: 90 TABLET | Refills: 1 | Status: SHIPPED | OUTPATIENT
Start: 2020-05-14 | End: 2021-02-02 | Stop reason: SDUPTHER

## 2020-06-24 LAB
ALBUMIN SERPL-MCNC: 4.1 G/DL (ref 3.5–5.2)
ALBUMIN/GLOB SERPL: 1.3 G/DL
ALP SERPL-CCNC: 58 U/L (ref 39–117)
ALT SERPL-CCNC: 18 U/L (ref 1–41)
AST SERPL-CCNC: 14 U/L (ref 1–40)
BILIRUB SERPL-MCNC: 0.6 MG/DL (ref 0.2–1.2)
BUN SERPL-MCNC: 13 MG/DL (ref 8–23)
BUN/CREAT SERPL: 14.8 (ref 7–25)
CALCIUM SERPL-MCNC: 9.4 MG/DL (ref 8.6–10.5)
CHLORIDE SERPL-SCNC: 103 MMOL/L (ref 98–107)
CHOLEST SERPL-MCNC: 184 MG/DL (ref 0–200)
CO2 SERPL-SCNC: 25.5 MMOL/L (ref 22–29)
CREAT SERPL-MCNC: 0.88 MG/DL (ref 0.76–1.27)
GLOBULIN SER CALC-MCNC: 3.1 GM/DL
GLUCOSE SERPL-MCNC: 113 MG/DL (ref 65–99)
HBA1C MFR BLD: 6.1 % (ref 4.8–5.6)
HDLC SERPL-MCNC: 32 MG/DL (ref 40–60)
LDLC SERPL CALC-MCNC: 95 MG/DL (ref 0–100)
POTASSIUM SERPL-SCNC: 4.1 MMOL/L (ref 3.5–5.2)
PROT SERPL-MCNC: 7.2 G/DL (ref 6–8.5)
SODIUM SERPL-SCNC: 139 MMOL/L (ref 136–145)
TRIGL SERPL-MCNC: 286 MG/DL (ref 0–150)
VLDLC SERPL CALC-MCNC: 57.2 MG/DL

## 2020-07-01 ENCOUNTER — OFFICE VISIT (OUTPATIENT)
Dept: FAMILY MEDICINE CLINIC | Facility: CLINIC | Age: 65
End: 2020-07-01

## 2020-07-01 VITALS
OXYGEN SATURATION: 99 % | HEART RATE: 84 BPM | SYSTOLIC BLOOD PRESSURE: 133 MMHG | BODY MASS INDEX: 34.1 KG/M2 | TEMPERATURE: 97.5 F | HEIGHT: 74 IN | DIASTOLIC BLOOD PRESSURE: 87 MMHG | WEIGHT: 265.7 LBS

## 2020-07-01 DIAGNOSIS — E78.00 PURE HYPERCHOLESTEROLEMIA: ICD-10-CM

## 2020-07-01 DIAGNOSIS — Z12.11 ENCOUNTER FOR SCREENING COLONOSCOPY: ICD-10-CM

## 2020-07-01 DIAGNOSIS — R13.19 OTHER DYSPHAGIA: ICD-10-CM

## 2020-07-01 DIAGNOSIS — Z00.00 MEDICARE WELCOME EXAM: Primary | ICD-10-CM

## 2020-07-01 DIAGNOSIS — I10 ESSENTIAL HYPERTENSION: ICD-10-CM

## 2020-07-01 DIAGNOSIS — R73.01 IMPAIRED FASTING GLUCOSE: ICD-10-CM

## 2020-07-01 PROCEDURE — G0009 ADMIN PNEUMOCOCCAL VACCINE: HCPCS | Performed by: FAMILY MEDICINE

## 2020-07-01 PROCEDURE — G0402 INITIAL PREVENTIVE EXAM: HCPCS | Performed by: FAMILY MEDICINE

## 2020-07-01 PROCEDURE — 99214 OFFICE O/P EST MOD 30 MIN: CPT | Performed by: FAMILY MEDICINE

## 2020-07-01 PROCEDURE — 90670 PCV13 VACCINE IM: CPT | Performed by: FAMILY MEDICINE

## 2020-07-01 NOTE — PROGRESS NOTES
The ABCs of the Annual Wellness Visit  Welcome to Medicare Visit    Chief Complaint   Patient presents with   • Medicare Wellness-Initial Visit     follow up labs        Subjective   History of Present Illness:  Daryl Decker is a 64 y.o. male who presents for a  Welcome to Medicare Visit.    HEALTH RISK ASSESSMENT    Recent Hospitalizations:  No hospitalization(s) within the last year.    Current Medical Providers:  Patient Care Team:  Edson Lafleur MD as PCP - General  Edson Lafleur MD as PCP - Family Medicine    Smoking Status:  Social History     Tobacco Use   Smoking Status Never Smoker   Smokeless Tobacco Never Used       Alcohol Consumption:  Social History     Substance and Sexual Activity   Alcohol Use Yes    Comment: occ       Depression Screen:   PHQ-2/PHQ-9 Depression Screening 7/1/2020   Little interest or pleasure in doing things 0   Feeling down, depressed, or hopeless 0   Total Score 0       Fall Risk Screen:  KHOA Fall Risk Assessment was completed, and patient is at LOW risk for falls.Assessment completed on:7/1/2020    Health Habits and Functional and Cognitive Screening:  Functional & Cognitive Status 7/1/2020   Do you have difficulty preparing food and eating? No   Do you have difficulty bathing yourself, getting dressed or grooming yourself? No   Do you have difficulty using the toilet? No   Do you have difficulty moving around from place to place? No   Do you have trouble with steps or getting out of a bed or a chair? No   Current Diet Unhealthy Diet   Dental Exam Not up to date   Eye Exam Not up to date   Exercise (times per week) 4 times per week   Current Exercise Activities Include Walking   Do you need help using the phone?  No   Are you deaf or do you have serious difficulty hearing?  No   Do you need help with transportation? No   Do you need help shopping? No   Do you need help preparing meals?  No   Do you need help with housework?  No   Do you need help with laundry? No   Do  you need help taking your medications? No   Do you need help managing money? No   Do you ever drive or ride in a car without wearing a seat belt? Yes   Have you felt unusual stress, anger or loneliness in the last month? Yes   Who do you live with? Spouse   If you need help, do you have trouble finding someone available to you? No   Have you been bothered in the last four weeks by sexual problems? No   Do you have difficulty concentrating, remembering or making decisions? No         Does the patient have evidence of cognitive impairment? No    Asprin use counseling:Taking ASA appropriately as indicated    Visual Acuity:    No exam data present    Age-appropriate Screening Schedule:  Refer to the list below for future screening recommendations based on patient's age, sex and/or medical conditions. Orders for these recommended tests are listed in the plan section. The patient has been provided with a written plan.    Health Maintenance   Topic Date Due   • INFLUENZA VACCINE  08/01/2020   • LIPID PANEL  06/24/2021   • COLONOSCOPY  02/06/2022   • TDAP/TD VACCINES (2 - Td) 12/18/2027   • ZOSTER VACCINE  Completed          The following portions of the patient's history were reviewed and updated as appropriate: allergies, current medications, past family history, past medical history, past social history, past surgical history and problem list.    Outpatient Medications Prior to Visit   Medication Sig Dispense Refill   • amLODIPine-benazepril (LOTREL) 10-20 MG per capsule TAKE 1 CAPSULE BY MOUTH DAILY. 90 capsule 1   • aspirin 81 MG tablet Take 1 tablet by mouth Daily.     • hydroCHLOROthiazide (HYDRODIURIL) 25 MG tablet TAKE 1 TABLET BY MOUTH DAILY. 90 tablet 1   • metFORMIN ER (GLUCOPHAGE-XR) 500 MG 24 hr tablet TAKE 1 TABLET BY MOUTH EVERY DAY WITH BREAKFAST 90 tablet 1   • mupirocin (BACTROBAN) 2 % ointment Apply  topically to the appropriate area as directed 3 (Three) Times a Day. 22 g 2   • sildenafil (REVATIO) 20  "MG tablet Take 20 mg by mouth 3 (Three) Times a Day.       No facility-administered medications prior to visit.        Patient Active Problem List   Diagnosis   • Male erectile disorder   • Essential hypertension   • Peripheral neuropathy   • Tinnitus of both ears   • History of prostate cancer   • Pure hypercholesterolemia   • Impaired fasting glucose       Advanced Care Planning:  ACP discussion was held with the patient during this visit. Patient does not have an advance directive, information provided.    Review of Systems    Compared to one year ago, the patient feels his physical health is the same.  Compared to one year ago, the patient feels his mental health is the same.    Reviewed chart for potential of high risk medication in the elderly: yes  Reviewed chart for potential of harmful drug interactions in the elderly:yes    Objective         Vitals:    07/01/20 0842   BP: 133/87   Pulse: 84   Temp: 97.5 °F (36.4 °C)   TempSrc: Tympanic   SpO2: 99%   Weight: 121 kg (265 lb 11.2 oz)   Height: 188 cm (74.02\")       Body mass index is 34.1 kg/m².  Discussed the patient's BMI with him. The BMI Is elevated.  Exercise indicated..    Physical Exam    Lab Results   Component Value Date     (H) 06/24/2020    CHLPL 184 06/24/2020    TRIG 286 (H) 06/24/2020    HDL 32 (L) 06/24/2020    LDL 95 06/24/2020    VLDL 57.2 06/24/2020    HGBA1C 6.10 (H) 06/24/2020        Procedures    Assessment/Plan   Medicare Risks and Personalized Health Plan  CMS Preventative Services Quick Reference  Colon Cancer Screening  Immunizations Discussed/Encouraged (specific immunizations; Prevnar )         The above risks/problems have been discussed with the patient.  Pertinent information has been shared with the patient in the After Visit Summary.  Follow up plans and orders are seen below in the Assessment/Plan Section.    Diagnoses and all orders for this visit:    1. Essential hypertension (Primary)    2. Pure " hypercholesterolemia    3. Impaired fasting glucose        Follow Up:  No follow-ups on file.     An After Visit Summary and PPPS were given to the patient.

## 2020-07-01 NOTE — PROGRESS NOTES
Subjective   Daryl Decker is a 64 y.o. male.     Medicare Wellness-Initial Visit (follow up labs )    History of Present Illness     Impaired fasting glucose.  He continues on metformin 500 mg daily.  A1c is unchanged at 6.1%.  Weight is down a couple pounds.  His neuropathy symptoms in his toes are improving.    Hypertension follow up. Doing well with current medication which he is taking as directed. No known high or low blood pressure episodes. No cardiovascular or neurological symptoms. Today's BP: 133/87.      Hyperlipidemia follow up.  No statin use.  Triglycerides are higher.  Recently 286.  LDL less than 100.    Lab Results   Component Value Date    CHLPL 184 06/24/2020    TRIG 286 (H) 06/24/2020    HDL 32 (L) 06/24/2020    LDL 95 06/24/2020     He states he gets a little bit of blood in the stool quarterly after bowel movements.  His last colonoscopy was approximately 9 years ago.    He states a few times a year he will have trouble with steak getting stuck when he swallows.  Especially if he goes to a certain "[employer]house.  Left walk around before goes down.  If you drink water it will gurgle he will vomit.  He otherwise has occasional GERD symptoms but not often.  And no pain with swallowing otherwise.    The following portions of the patient's history were reviewed and updated as appropriate: allergies, current medications, past family history, past medical history, past social history, past surgical history and problem list.      Review of Systems   Constitutional: Negative.    HENT: Negative.    Respiratory: Negative.    Cardiovascular: Negative.    Gastrointestinal: Positive for blood in stool.        Dysphagia, see above   Endocrine: Negative.    Genitourinary: Negative.  Negative for hematuria.   Musculoskeletal: Negative.    Skin: Negative.    Neurological: Negative.    Psychiatric/Behavioral: Negative.    All other systems reviewed and are negative.      Objective   Blood pressure 133/87, pulse  "84, temperature 97.5 °F (36.4 °C), temperature source Tympanic, height 188 cm (74.02\"), weight 121 kg (265 lb 11.2 oz), SpO2 99 %.  Physical Exam   Constitutional: He is oriented to person, place, and time. He appears well-developed and well-nourished. No distress.   HENT:   Head: Atraumatic.   Right Ear: Tympanic membrane, external ear and ear canal normal.   Left Ear: Tympanic membrane, external ear and ear canal normal.   Nose: Nose normal.   Mouth/Throat: Oropharynx is clear and moist. No oropharyngeal exudate.   Eyes: Conjunctivae are normal. Right eye exhibits no discharge. Left eye exhibits no discharge. No scleral icterus.   Neck: Normal range of motion. Neck supple. No thyromegaly present.   Cardiovascular: Normal rate, regular rhythm, normal heart sounds and intact distal pulses.   Pulmonary/Chest: Effort normal and breath sounds normal. No respiratory distress. He has no wheezes. He has no rales.   Abdominal: Soft. Bowel sounds are normal. He exhibits no distension. There is no tenderness.   Musculoskeletal: He exhibits no edema.   Lymphadenopathy:     He has no cervical adenopathy.   Neurological: He is alert and oriented to person, place, and time. He exhibits normal muscle tone. Coordination normal.   Skin: Skin is warm and dry. No rash noted.   Psychiatric: He has a normal mood and affect. His behavior is normal. Judgment and thought content normal.   Nursing note and vitals reviewed.      Assessment/Plan   Daryl was seen today for medicare wellness-initial visit.    Diagnoses and all orders for this visit:    Essential hypertension    Pure hypercholesterolemia    Impaired fasting glucose    Encounter for screening colonoscopy  -     Ambulatory Referral For Screening Colonoscopy    Other dysphagia  -     Ambulatory referral for Screening EGD    Other orders  -     Pneumococcal Conjugate Vaccine 13-Valent All      Hypertension.  Overall well controlled.  Continue hydrochlorothiazide and " Lotrel.    Impaired fasting glucose.  Prediabetes.  A1c stable.  We discussed diet and exercise.  Continue metformin as is.    Mild peripheral neuropathy.  Improving.    Blood in stool on occasions.  He will be coming due for colonoscopy in a year or so.  I recommend we go ahead and get it done sooner.    Dysphagia.  Steak get stuck when he eats on occasion.  Given that he needs a colonoscopy done, also recommend consideration of a EGD.  He has no pain with swallowing.  And no significant GERD symptoms.  Surgical consultation placed.    Prostate cancer history.  He is continues to follow with urology.    Follow-up in 6 months.  Sooner as needed.         Answers for HPI/ROS submitted by the patient on 6/24/2020   What is the primary reason for your visit?: Physical

## 2020-07-20 ENCOUNTER — OFFICE VISIT (OUTPATIENT)
Dept: SURGERY | Facility: CLINIC | Age: 65
End: 2020-07-20

## 2020-07-20 VITALS — WEIGHT: 264 LBS | HEIGHT: 74 IN | BODY MASS INDEX: 33.88 KG/M2

## 2020-07-20 DIAGNOSIS — Z12.11 SCREEN FOR COLON CANCER: ICD-10-CM

## 2020-07-20 DIAGNOSIS — R13.19 ESOPHAGEAL DYSPHAGIA: Primary | ICD-10-CM

## 2020-07-20 PROCEDURE — 99214 OFFICE O/P EST MOD 30 MIN: CPT | Performed by: SURGERY

## 2020-07-20 NOTE — PROGRESS NOTES
SUMMARY (A/P):    64-year-old gentleman with intermittent episodes of lower sternal dysphagia.  He is also greater than 10 years out from his last colonoscopy.  With this presentation I recommended proceeding with EGD (including possible balloon dilation) and colonoscopy.      CC:    Dysphagia    HPI:    64-year-old gentleman with several year history of intermittent lower sternal level mild to moderate dysphagia associated with chest pain/discomfort at the same level.  No significant history of gastroesophageal reflux disease.    PSH:    • Colonoscopy greater than 10 years ago  • Radiation of prostate  • Right hydrocelectomy 2015    PMH:    • Prostate cancer  • Hypertension  • Hyperlipidemia  • Diabetes    FAMILY HISTORY:    • Cousin with achalasia  • Negative for colorectal cancer    SOCIAL HISTORY:   • Denies tobacco use  • Occasional alcohol use    ALLERGIES:   • None    MEDICATIONS:   • None    ROS:    Influenza Like Illness: no fever, no  cough, no  sore throat, no  body aches, no loss of sense of taste or smell, no known exposure to person with Covid-19.  All other systems reviewed and negative other than presenting complaints.    PHYSICAL EXAM:   • Constitutional: Well-developed well-nourished, no acute distress  • Vital signs: Weight 264 pounds, height 74 inches, BMI 33.9  • Eyes: Conjunctiva normal, sclera nonicteric  • ENMT: Hearing grossly normal, oral mucosa moist  • Neck: Supple, no palpable mass, trachea midline  • Respiratory: Clear to auscultation, normal inspiratory effort  • Cardiovascular: Regular rate, no murmur, no carotid bruit, no peripheral edema, no jugular venous distention  • Gastrointestinal: Soft, nontender, no palpable mass, no hepatosplenomegaly, small to moderate reducible umbilical hernia  • Lymphatics (palpable nodes):  cervical-negative, axillary-negative  • Skin:  Warm, dry, no rash on visualized skin surfaces  • Musculoskeletal: Symmetric strength, normal gait  • Psychiatric:  Alert and oriented ×3, normal affect     VON COTE M.D.

## 2020-08-12 ENCOUNTER — TRANSCRIBE ORDERS (OUTPATIENT)
Dept: SLEEP MEDICINE | Facility: HOSPITAL | Age: 65
End: 2020-08-12

## 2020-08-12 DIAGNOSIS — Z01.818 OTHER SPECIFIED PRE-OPERATIVE EXAMINATION: Primary | ICD-10-CM

## 2020-08-17 ENCOUNTER — LAB (OUTPATIENT)
Dept: LAB | Facility: HOSPITAL | Age: 65
End: 2020-08-17

## 2020-08-17 DIAGNOSIS — Z01.818 OTHER SPECIFIED PRE-OPERATIVE EXAMINATION: ICD-10-CM

## 2020-08-17 PROCEDURE — C9803 HOPD COVID-19 SPEC COLLECT: HCPCS

## 2020-08-17 PROCEDURE — U0004 COV-19 TEST NON-CDC HGH THRU: HCPCS

## 2020-08-18 LAB
REF LAB TEST METHOD: NORMAL
SARS-COV-2 RNA RESP QL NAA+PROBE: NOT DETECTED

## 2020-08-19 ENCOUNTER — ANESTHESIA (OUTPATIENT)
Dept: GASTROENTEROLOGY | Facility: HOSPITAL | Age: 65
End: 2020-08-19

## 2020-08-19 ENCOUNTER — HOSPITAL ENCOUNTER (OUTPATIENT)
Facility: HOSPITAL | Age: 65
Setting detail: HOSPITAL OUTPATIENT SURGERY
Discharge: HOME | End: 2020-08-19
Attending: SURGERY | Admitting: SURGERY

## 2020-08-19 ENCOUNTER — ANESTHESIA EVENT (OUTPATIENT)
Dept: GASTROENTEROLOGY | Facility: HOSPITAL | Age: 65
End: 2020-08-19

## 2020-08-19 VITALS
OXYGEN SATURATION: 94 % | DIASTOLIC BLOOD PRESSURE: 71 MMHG | SYSTOLIC BLOOD PRESSURE: 114 MMHG | HEART RATE: 88 BPM | BODY MASS INDEX: 33.37 KG/M2 | RESPIRATION RATE: 16 BRPM | TEMPERATURE: 98.1 F | HEIGHT: 74 IN | WEIGHT: 260 LBS

## 2020-08-19 LAB — GLUCOSE BLDC GLUCOMTR-MCNC: 138 MG/DL (ref 70–130)

## 2020-08-19 PROCEDURE — 82962 GLUCOSE BLOOD TEST: CPT

## 2020-08-19 PROCEDURE — G0121 COLON CA SCRN NOT HI RSK IND: HCPCS | Performed by: SURGERY

## 2020-08-19 PROCEDURE — 43235 EGD DIAGNOSTIC BRUSH WASH: CPT | Performed by: SURGERY

## 2020-08-19 RX ORDER — LIDOCAINE HYDROCHLORIDE 10 MG/ML
0.5 INJECTION, SOLUTION INFILTRATION; PERINEURAL ONCE AS NEEDED
Status: DISCONTINUED | OUTPATIENT
Start: 2020-08-19 | End: 2020-08-19 | Stop reason: HOSPADM

## 2020-08-19 RX ORDER — SODIUM CHLORIDE 0.9 % (FLUSH) 0.9 %
10 SYRINGE (ML) INJECTION AS NEEDED
Status: DISCONTINUED | OUTPATIENT
Start: 2020-08-19 | End: 2020-08-19 | Stop reason: HOSPADM

## 2020-08-19 RX ORDER — LIDOCAINE HYDROCHLORIDE 20 MG/ML
INJECTION, SOLUTION INFILTRATION; PERINEURAL AS NEEDED
Status: DISCONTINUED | OUTPATIENT
Start: 2020-08-19 | End: 2020-08-19 | Stop reason: SURG

## 2020-08-19 RX ORDER — SODIUM CHLORIDE, SODIUM LACTATE, POTASSIUM CHLORIDE, CALCIUM CHLORIDE 600; 310; 30; 20 MG/100ML; MG/100ML; MG/100ML; MG/100ML
1000 INJECTION, SOLUTION INTRAVENOUS CONTINUOUS
Status: DISCONTINUED | OUTPATIENT
Start: 2020-08-19 | End: 2020-08-19 | Stop reason: HOSPADM

## 2020-08-19 RX ADMIN — SODIUM CHLORIDE, POTASSIUM CHLORIDE, SODIUM LACTATE AND CALCIUM CHLORIDE 1000 ML: 600; 310; 30; 20 INJECTION, SOLUTION INTRAVENOUS at 11:10

## 2020-08-19 RX ADMIN — LIDOCAINE HYDROCHLORIDE 100 MG: 20 INJECTION, SOLUTION INFILTRATION; PERINEURAL at 11:28

## 2020-08-19 NOTE — OP NOTE
PREOPERATIVE DIAGNOSIS:  Dysphagia  Screening    POSTOPERATIVE DIAGNOSIS AND FINDINGS:  Small sliding hiatal hernia  Diverticulosis    PROCEDURE:  EGD  Colonoscopy to cecum     SURGEON:  Senthil Mcduffie MD    ANESTHESIA:  MAC    SPECIMEN(S):  none    DESCRIPTION:  In decubitus position endoscope was inserted into the esophagus, stomach and duodenum. Antegrade and retroflex view of stomach performed.    There were no gross abnormalities of the first, second or third portions of the duodenum.    Gastric antrum, body and retroflexed view of stomach were normal.    GE junction and esophagus were normal with the exception of a small sliding hiatal hernia.    Digital rectal exam was normal. Colonoscope inserted under direct visualization of lumen to cecum confirmed by visualization of ileocecal valve and appendiceal orifice.    Scope slowly withdrawn circumferentially examining all mucosal surfaces.    Quality of bowel preparation good.    There were no mucosal abnormalities.  Minimal sigmoid diverticulosis noted.    Tolerated well.    RECOMMENDATION FOR FUTURE SURVEILLANCE:  10 years    Senthil Mcduffie M.D.

## 2020-08-19 NOTE — ANESTHESIA PREPROCEDURE EVALUATION
Anesthesia Evaluation     Patient summary reviewed and Nursing notes reviewed   NPO Solid Status: > 8 hours  NPO Liquid Status: > 4 hours           Airway   Mallampati: II  TM distance: >3 FB  Neck ROM: full  no difficulty expected  Dental - normal exam     Pulmonary - normal exam   Cardiovascular - normal exam    (+) hypertension, hyperlipidemia,       Neuro/Psych  (+) numbness,     GI/Hepatic/Renal/Endo    (+) obesity,       Musculoskeletal     Abdominal  - normal exam   Substance History      OB/GYN          Other      history of cancer                    Anesthesia Plan    ASA 2     MAC       Anesthetic plan, all risks, benefits, and alternatives have been provided, discussed and informed consent has been obtained with: patient.

## 2020-08-19 NOTE — ANESTHESIA POSTPROCEDURE EVALUATION
"Patient: Daryl Decker    Procedure Summary     Date:  08/19/20 Room / Location:  [facility] ENDOSCOPY 1 / [facility] ENDOSCOPY    Anesthesia Start:  1125 Anesthesia Stop:  1154    Procedures:       COLONOSCOPY TO CECUM (N/A )      ESOPHAGOGASTRODUODENOSCOPY (N/A Esophagus) Diagnosis:       Esophageal dysphagia      (Esophageal dysphagia [R13.10])    Surgeon:  Senthil Mcduffie MD Provider:  Zeb Melendrez MD    Anesthesia Type:  MAC ASA Status:  2          Anesthesia Type: MAC    Vitals  Vitals Value Taken Time   BP 81/60 8/19/2020 11:52 AM   Temp     Pulse 77 8/19/2020 11:52 AM   Resp 18 8/19/2020 11:52 AM   SpO2 95 % 8/19/2020 11:52 AM           Post Anesthesia Care and Evaluation    Patient location during evaluation: bedside  Patient participation: complete - patient participated  Level of consciousness: awake and alert  Pain management: adequate  Airway patency: patent  Anesthetic complications: No anesthetic complications    Cardiovascular status: acceptable  Respiratory status: acceptable  Hydration status: acceptable    Comments: BP (!) 81/60 (BP Location: Left arm, Patient Position: Lying)   Pulse 77   Temp 36.7 °C (98.1 °F) (Oral)   Resp 18   Ht 188 cm (74\")   Wt 118 kg (260 lb)   SpO2 95%   BMI 33.38 kg/m²       "

## 2020-08-19 NOTE — DISCHARGE INSTRUCTIONS
For the next 24 hours patient needs to be with a responsible adult.    For 24 hours DO NOT drive, operate machinery, appliances, drink alcohol, make important decisions or sign legal documents.    Start with a light or bland diet if you are feeling sick to your stomach otherwise advance to regular diet as tolerated.    Follow recommendations on procedure report if provided by your doctor.    Call Dr Mcduffie for problems     Problems may include but not limited to: large amounts of bleeding, trouble breathing, repeated vomiting, severe unrelieved pain, fever or chills.      Colonoscopy, Adult, Care After  This sheet gives you information about how to care for yourself after your procedure. Your doctor may also give you more specific instructions. If you have problems or questions, call your doctor.  What can I expect after the procedure?  After the procedure, it is common to have:  · A small amount of blood in your poop for 24 hours.  · Some gas.  · Mild cramping or bloating in your belly.  Follow these instructions at home:  General instructions  · For the first 24 hours after the procedure:  ? Do not drive or use machinery.  ? Do not sign important documents.  ? Do not drink alcohol.  ? Do your daily activities more slowly than normal.  ? Eat foods that are soft and easy to digest.  · Take over-the-counter or prescription medicines only as told by your doctor.  To help cramping and bloating:    · Try walking around.  · Put heat on your belly (abdomen) as told by your doctor. Use a heat source that your doctor recommends, such as a moist heat pack or a heating pad.  ? Put a towel between your skin and the heat source.  ? Leave the heat on for 20-30 minutes.  ? Remove the heat if your skin turns bright red. This is especially important if you cannot feel pain, heat, or cold. You can get burned.  Eating and drinking    · Drink enough fluid to keep your pee (urine) clear or pale yellow.  · Return to your normal diet as  told by your doctor. Avoid heavy or fried foods that are hard to digest.  · Avoid drinking alcohol for as long as told by your doctor.  Contact a doctor if:  · You have blood in your poop (stool) 2-3 days after the procedure.  Get help right away if:  · You have more than a small amount of blood in your poop.  · You see large clumps of tissue (blood clots) in your poop.  · Your belly is swollen.  · You feel sick to your stomach (nauseous).  · You throw up (vomit).  · You have a fever.  · You have belly pain that gets worse, and medicine does not help your pain.  Summary  · After the procedure, it is common to have a small amount of blood in your poop. You may also have mild cramping and bloating in your belly.  · For the first 24 hours after the procedure, do not drive or use machinery, do not sign important documents, and do not drink alcohol.  · Get help right away if you have a lot of blood in your poop, feel sick to your stomach, have a fever, or have more belly pain.  This information is not intended to replace advice given to you by your health care provider. Make sure you discuss any questions you have with your health care provider.  Document Released: 01/20/2012 Document Revised: 10/18/2018 Document Reviewed: 09/11/2017  Elsevier Patient Education © 2020 [employer]  Diverticulosis    Diverticulosis is a condition that develops when small pouches (diverticula) form in the wall of the large intestine (colon). The colon is where water is absorbed and stool is formed. The pouches form when the inside layer of the colon pushes through weak spots in the outer layers of the colon. You may have a few pouches or many of them.  What are the causes?  The cause of this condition is not known.  What increases the risk?  The following factors may make you more likely to develop this condition:  · Being older than age 60. Your risk for this condition increases with age. Diverticulosis is rare among people younger than  age 30. By age 80, many people have it.  · Eating a low-fiber diet.  · Having frequent constipation.  · Being overweight.  · Not getting enough exercise.  · Smoking.  · Taking over-the-counter pain medicines, like aspirin and ibuprofen.  · Having a family history of diverticulosis.  What are the signs or symptoms?  In most people, there are no symptoms of this condition. If you do have symptoms, they may include:  · Bloating.  · Cramps in the abdomen.  · Constipation or diarrhea.  · Pain in the lower left side of the abdomen.  How is this diagnosed?  This condition is most often diagnosed during an exam for other colon problems. Because diverticulosis usually has no symptoms, it often cannot be diagnosed independently. This condition may be diagnosed by:  · Using a flexible scope to examine the colon (colonoscopy).  · Taking an X-ray of the colon after dye has been put into the colon (barium enema).  · Doing a CT scan.  How is this treated?  You may not need treatment for this condition if you have never developed an infection related to diverticulosis. If you have had an infection before, treatment may include:  · Eating a high-fiber diet. This may include eating more fruits, vegetables, and grains.  · Taking a fiber supplement.  · Taking a live bacteria supplement (probiotic).  · Taking medicine to relax your colon.  · Taking antibiotic medicines.  Follow these instructions at home:  · Drink 6-8 glasses of water or more each day to prevent constipation.  · Try not to strain when you have a bowel movement.  · If you have had an infection before:  ? Eat more fiber as directed by your health care provider or your diet and nutrition specialist (dietitian).  ? Take a fiber supplement or probiotic, if your health care provider approves.  · Take over-the-counter and prescription medicines only as told by your health care provider.  · If you were prescribed an antibiotic, take it as told by your health care provider. Do  not stop taking the antibiotic even if you start to feel better.  · Keep all follow-up visits as told by your health care provider. This is important.  Contact a health care provider if:  · You have pain in your abdomen.  · You have bloating.  · You have cramps.  · You have not had a bowel movement in 3 days.  Get help right away if:  · Your pain gets worse.  · Your bloating becomes very bad.  · You have a fever or chills, and your symptoms suddenly get worse.  · You vomit.  · You have bowel movements that are bloody or black.  · You have bleeding from your rectum.  Summary  · Diverticulosis is a condition that develops when small pouches (diverticula) form in the wall of the large intestine (colon).  · You may have a few pouches or many of them.  · This condition is most often diagnosed during an exam for other colon problems.  · If you have had an infection related to diverticulosis, treatment may include increasing the fiber in your diet, taking supplements, or taking medicines.  This information is not intended to replace advice given to you by your health care provider. Make sure you discuss any questions you have with your health care provider.  Document Released: 09/14/2005 Document Revised: 11/30/2018 Document Reviewed: 11/06/2017  [employer] Patient Education © 2020 [employer]  Upper Endoscopy, Adult, Care After  This sheet gives you information about how to care for yourself after your procedure. Your health care provider may also give you more specific instructions. If you have problems or questions, contact your health care provider.  What can I expect after the procedure?  After the procedure, it is common to have:  · A sore throat.  · Mild stomach pain or discomfort.  · Bloating.  · Nausea.  Follow these instructions at home:    · Follow instructions from your health care provider about what to eat or drink after your procedure.  · Return to your normal activities as told by your health care provider.  Ask your health care provider what activities are safe for you.  · Take over-the-counter and prescription medicines only as told by your health care provider.  · Do not drive for 24 hours if you were given a sedative during your procedure.  · Keep all follow-up visits as told by your health care provider. This is important.  Contact a health care provider if you have:  · A sore throat that lasts longer than one day.  · Trouble swallowing.  Get help right away if:  · You vomit blood or your vomit looks like coffee grounds.  · You have:  ? A fever.  ? Bloody, black, or tarry stools.  ? A severe sore throat or you cannot swallow.  ? Difficulty breathing.  ? Severe pain in your chest or abdomen.  Summary  · After the procedure, it is common to have a sore throat, mild stomach discomfort, bloating, and nausea.  · Do not drive for 24 hours if you were given a sedative during the procedure.  · Follow instructions from your health care provider about what to eat or drink after your procedure.  · Return to your normal activities as told by your health care provider.  This information is not intended to replace advice given to you by your health care provider. Make sure you discuss any questions you have with your health care provider.  Document Released: 06/18/2013 Document Revised: 06/11/2019 Document Reviewed: 05/20/2019  [employer] Patient Education © 2020 [employer]    Hiatal Hernia    A hiatal hernia occurs when part of the stomach slides above the muscle that separates the abdomen from the chest (diaphragm). A person can be born with a hiatal hernia (congenital), or it may develop over time. In almost all cases of hiatal hernia, only the top part of the stomach pushes through the diaphragm.  Many people have a hiatal hernia with no symptoms. The larger the hernia, the more likely it is that you will have symptoms. In some cases, a hiatal hernia allows stomach acid to flow back into the tube that carries food from your  mouth to your stomach (esophagus). This may cause heartburn symptoms. Severe heartburn symptoms may mean that you have developed a condition called gastroesophageal reflux disease (GERD).  What are the causes?  This condition is caused by a weakness in the opening (hiatus) where the esophagus passes through the diaphragm to attach to the upper part of the stomach. A person may be born with a weakness in the hiatus, or a weakness can develop over time.  What increases the risk?  This condition is more likely to develop in:  · Older people. Age is a major risk factor for a hiatal hernia, especially if you are over the age of 50.  · Pregnant women.  · People who are overweight.  · People who have frequent constipation.  What are the signs or symptoms?  Symptoms of this condition usually develop in the form of GERD symptoms. Symptoms include:  · Heartburn.  · Belching.  · Indigestion.  · Trouble swallowing.  · Coughing or wheezing.  · Sore throat.  · Hoarseness.  · Chest pain.  · Nausea and vomiting.  How is this diagnosed?  This condition may be diagnosed during testing for GERD. Tests that may be done include:  · X-rays of your stomach or chest.  · An upper gastrointestinal (GI) series. This is an X-ray exam of your GI tract that is taken after you swallow a chalky liquid that shows up clearly on the X-ray.  · Endoscopy. This is a procedure to look into your stomach using a thin, flexible tube that has a tiny camera and light on the end of it.  How is this treated?  This condition may be treated by:  · Dietary and lifestyle changes to help reduce GERD symptoms.  · Medicines. These may include:  ? Over-the-counter antacids.  ? Medicines that make your stomach empty more quickly.  ? Medicines that block the production of stomach acid (H2 blockers).  ? Stronger medicines to reduce stomach acid (proton pump inhibitors).  · Surgery to repair the hernia, if other treatments are not helping.  If you have no symptoms, you may  not need treatment.  Follow these instructions at home:  Lifestyle and activity  · Do not use any products that contain nicotine or tobacco, such as cigarettes and e-cigarettes. If you need help quitting, ask your health care provider.  · Try to achieve and maintain a healthy body weight.  · Avoid putting pressure on your abdomen. Anything that puts pressure on your abdomen increases the amount of acid that may be pushed up into your esophagus.  ? Avoid bending over, especially after eating.  ? Raise the head of your bed by putting blocks under the legs. This keeps your head and esophagus higher than your stomach.  ? Do not wear tight clothing around your chest or stomach.  ? Try not to strain when having a bowel movement, when urinating, or when lifting heavy objects.  Eating and drinking  · Avoid foods that can worsen GERD symptoms. These may include:  ? Fatty foods, like fried foods.  ? Citrus fruits, like oranges or lemon.  ? Other foods and drinks that contain acid, like orange juice or tomatoes.  ? Spicy food.  ? Chocolate.  · Eat frequent small meals instead of three large meals a day. This helps prevent your stomach from getting too full.  ? Eat slowly.  ? Do not lie down right after eating.  ? Do not eat 1-2 hours before bed.  · Do not drink beverages with caffeine. These include cola, coffee, cocoa, and tea.  · Do not drink alcohol.  General instructions  · Take over-the-counter and prescription medicines only as told by your health care provider.  · Keep all follow-up visits as told by your health care provider. This is important.  Contact a health care provider if:  · Your symptoms are not controlled with medicines or lifestyle changes.  · You are having trouble swallowing.  · You have coughing or wheezing that will not go away.  Get help right away if:  · Your pain is getting worse.  · Your pain spreads to your arms, neck, jaw, teeth, or back.  · You have shortness of breath.  · You sweat for no  reason.  · You feel sick to your stomach (nauseous) or you vomit.  · You vomit blood.  · You have bright red blood in your stools.  · You have black, tarry stools.  This information is not intended to replace advice given to you by your health care provider. Make sure you discuss any questions you have with your health care provider.  Document Released: 03/09/2005 Document Revised: 11/30/2018 Document Reviewed: 07/23/2018  Elsevier Patient Education © 2020 Else[employer] Inc.

## 2020-09-29 ENCOUNTER — RESULTS ENCOUNTER (OUTPATIENT)
Dept: FAMILY MEDICINE CLINIC | Facility: CLINIC | Age: 65
End: 2020-09-29

## 2020-09-29 DIAGNOSIS — R73.01 IMPAIRED FASTING GLUCOSE: ICD-10-CM

## 2020-09-29 DIAGNOSIS — E78.00 PURE HYPERCHOLESTEROLEMIA: ICD-10-CM

## 2020-10-23 ENCOUNTER — TELEPHONE (OUTPATIENT)
Dept: FAMILY MEDICINE CLINIC | Facility: CLINIC | Age: 65
End: 2020-10-23

## 2020-10-23 NOTE — TELEPHONE ENCOUNTER
PATIENT CALLED STATING HE HAS AN APPOINTMENT WITH DR WLISON ON 10/26/20 AND HE PREFERS TO HAVE A MYCHART VIDEO VISIT DONE INSTEAD OF IN PERSON VISIT. WHEN TRYING TO SCHEDULE, IT SHOWS DR WILSON HAS NO AVAILABILITY FOR A MYCHART VIDEO VISIT ALTHOUGH DR WILSON ADVISED THE PATIENT A VIDEO VISIT WOULD BE GREAT.     PLEASE ADVISE. PATIENT CALL BACK: [phone]

## 2020-10-26 ENCOUNTER — TELEMEDICINE (OUTPATIENT)
Dept: FAMILY MEDICINE CLINIC | Facility: CLINIC | Age: 65
End: 2020-10-26

## 2020-10-26 DIAGNOSIS — E78.2 MIXED HYPERLIPIDEMIA: Primary | ICD-10-CM

## 2020-10-26 PROCEDURE — 99213 OFFICE O/P EST LOW 20 MIN: CPT | Performed by: FAMILY MEDICINE

## 2020-10-26 RX ORDER — ATORVASTATIN CALCIUM 10 MG/1
10 TABLET, FILM COATED ORAL DAILY
Qty: 90 TABLET | Refills: 1 | Status: SHIPPED | OUTPATIENT
Start: 2020-10-26 | End: 2021-02-02 | Stop reason: SDUPTHER

## 2020-10-26 NOTE — PROGRESS NOTES
Subjective   Daryl Decker is a 64 y.o. male.     Chief Complaint   Patient presents with   • Hyperlipidemia        History of Present Illness    Coronavirus pandemic telehealth visit.  Good audio and video connection.  Patient gave informed consent through the [facility] check in process.    Patient and wife concerned about vascular risk factors.  Patient has hypertension is well treated.  He has prediabetes on Metformin.  A1c low 6% range.  He has elevated triglycerides in the mid to high 200s.  His HDL is low.  His LDL is 90.    Paternal grandmother with aortic dissection.  Father had a femoral aneurysm.  Father was a heavy smoker and smoked until he  of lung cancer.    Patient has never smoked.  He has 1 or 2 beers a night.  He has been trying to watch his diet with less carbohydrates.    Social History     Tobacco Use   • Smoking status: Never Smoker   • Smokeless tobacco: Never Used   Substance Use Topics   • Alcohol use: Yes     Comment: 5 drinks per week   • Drug use: No     The 10-year ASCVD risk score (Windfallandrzej STAFFORD Jr., et al., 2013) is: 14.8%    Values used to calculate the score:      Age: 64 years      Sex: Male      Is Non- : No      Diabetic: No      Tobacco smoker: No      Systolic Blood Pressure: 114 mmHg      Is BP treated: Yes      HDL Cholesterol: 32 mg/dL      Total Cholesterol: 184 mg/dL      The following portions of the patient's history were reviewed and updated as appropriate: allergies, current medications, past family history, past medical history, past social history, past surgical history and problem list.          Review of Systems   Constitutional: Negative.    Psychiatric/Behavioral: Negative.        Objective   There were no vitals taken for this visit.  Physical Exam  Constitutional:       General: He is not in acute distress.  HENT:      Head: Atraumatic.   Pulmonary:      Effort: Pulmonary effort is normal. No respiratory distress.   Skin:     Coloration:  Skin is not pale.   Neurological:      Mental Status: He is alert and oriented to person, place, and time.      Coordination: Coordination normal.   Psychiatric:         Behavior: Behavior normal.         Assessment/Plan   Diagnoses and all orders for this visit:    1. Mixed hyperlipidemia (Primary)    Other orders  -     atorvastatin (LIPITOR) 10 MG tablet; Take 1 tablet by mouth Daily.  Dispense: 90 tablet; Refill: 1      Mixed hyperlipidemia.  With metabolic syndrome.  High glucose, triglycerides, low HDL.  His 10-year risk of coronary artery disease based on the ACC guidelines is 14.8%.  We discussed the benefits and risks of statins.  Benefit would be reduction in cardiovascular risk.  Risk would be muscle aches.  At this time benefits outweigh risks.  I am recommending atorvastatin 10 mg daily.  Side effects discussed.  I will see him back as scheduled in January with lab work prior.    At this time no absolute indication for stress test or vascular ultrasound screening.  However we will discuss further as needed.    Duration of visit 15 minutes.

## 2021-01-19 LAB
ALBUMIN SERPL-MCNC: 4.5 G/DL (ref 3.5–5.2)
ALBUMIN/GLOB SERPL: 1.6 G/DL
ALP SERPL-CCNC: 70 U/L (ref 39–117)
ALT SERPL-CCNC: 26 U/L (ref 1–41)
AST SERPL-CCNC: 23 U/L (ref 1–40)
BILIRUB SERPL-MCNC: 0.7 MG/DL (ref 0–1.2)
BUN SERPL-MCNC: 11 MG/DL (ref 8–23)
BUN/CREAT SERPL: 10.6 (ref 7–25)
CALCIUM SERPL-MCNC: 9.5 MG/DL (ref 8.6–10.5)
CHLORIDE SERPL-SCNC: 99 MMOL/L (ref 98–107)
CHOLEST SERPL-MCNC: 152 MG/DL (ref 0–200)
CO2 SERPL-SCNC: 28.1 MMOL/L (ref 22–29)
CREAT SERPL-MCNC: 1.04 MG/DL (ref 0.76–1.27)
GLOBULIN SER CALC-MCNC: 2.8 GM/DL
GLUCOSE SERPL-MCNC: 143 MG/DL (ref 65–99)
HBA1C MFR BLD: 6.9 % (ref 4.8–5.6)
HDLC SERPL-MCNC: 36 MG/DL (ref 40–60)
LDLC SERPL CALC-MCNC: 87 MG/DL (ref 0–100)
POTASSIUM SERPL-SCNC: 4.4 MMOL/L (ref 3.5–5.2)
PROT SERPL-MCNC: 7.3 G/DL (ref 6–8.5)
SODIUM SERPL-SCNC: 138 MMOL/L (ref 136–145)
TRIGL SERPL-MCNC: 169 MG/DL (ref 0–150)
VLDLC SERPL CALC-MCNC: 29 MG/DL (ref 5–40)

## 2021-01-28 RX ORDER — ATORVASTATIN CALCIUM 10 MG/1
TABLET, FILM COATED ORAL
Qty: 90 TABLET | Refills: 1 | OUTPATIENT
Start: 2021-01-28

## 2021-02-02 ENCOUNTER — OFFICE VISIT (OUTPATIENT)
Dept: FAMILY MEDICINE CLINIC | Facility: CLINIC | Age: 66
End: 2021-02-02

## 2021-02-02 VITALS
SYSTOLIC BLOOD PRESSURE: 138 MMHG | HEART RATE: 89 BPM | DIASTOLIC BLOOD PRESSURE: 86 MMHG | HEIGHT: 74 IN | BODY MASS INDEX: 34.93 KG/M2 | TEMPERATURE: 97.3 F | WEIGHT: 272.2 LBS | OXYGEN SATURATION: 96 %

## 2021-02-02 DIAGNOSIS — E11.9 TYPE 2 DIABETES MELLITUS WITHOUT COMPLICATION, WITHOUT LONG-TERM CURRENT USE OF INSULIN (HCC): Primary | ICD-10-CM

## 2021-02-02 DIAGNOSIS — E78.00 PURE HYPERCHOLESTEROLEMIA: Chronic | ICD-10-CM

## 2021-02-02 DIAGNOSIS — I10 ESSENTIAL HYPERTENSION: Chronic | ICD-10-CM

## 2021-02-02 PROBLEM — R73.01 IMPAIRED FASTING GLUCOSE: Chronic | Status: ACTIVE | Noted: 2018-05-21

## 2021-02-02 PROBLEM — Z85.46 HISTORY OF PROSTATE CANCER: Chronic | Status: ACTIVE | Noted: 2017-12-18

## 2021-02-02 PROCEDURE — 99214 OFFICE O/P EST MOD 30 MIN: CPT | Performed by: FAMILY MEDICINE

## 2021-02-02 RX ORDER — ATORVASTATIN CALCIUM 20 MG/1
20 TABLET, FILM COATED ORAL DAILY
Qty: 90 TABLET | Refills: 1 | Status: SHIPPED | OUTPATIENT
Start: 2021-02-02 | End: 2021-08-09

## 2021-02-02 RX ORDER — METFORMIN HYDROCHLORIDE 500 MG/1
1000 TABLET, EXTENDED RELEASE ORAL
Qty: 180 TABLET | Refills: 1 | Status: SHIPPED | OUTPATIENT
Start: 2021-02-02 | End: 2021-08-09

## 2021-02-02 NOTE — PROGRESS NOTES
"Chief Complaint  Hypertension (follow up )    Subjective          Daryl Decker presents to [facility] for   History of Present Illness    Follow-up impaired fasting glucose.  Has been on extended release Metformin 500 mg daily for some time now.  However he is working 70 hours.  He is gained weight.  He is not exercising as much.  Eating a lot of fast food and also eating out of a \"hand\".  His A1c went up to 6.9% and his fasting glucose is now 143.  Meets criteria for diabetes type 2.  He has known peripheral neuropathy but it is unchanged.  He otherwise feels well.  He has 1 beer a day.    Hypertension.  Continues amlodipine benazepril combination along with hydrochlorothiazide.  Blood pressures are slightly elevated today 130/86.  No cardiovascular symptoms described.    Hyperlipidemia.  Elevated cardiovascular risk because of his history of hypertension, and now diabetes.  He we started him on atorvastatin 20 mg daily.  He had about a 10 to 20% drop in his LDL.  Were looking for a larger drop.  His HDL did come up slightly despite not exercising as much and gained weight.      Objective   Vital Signs:   /86   Pulse 89   Temp 97.3 °F (36.3 °C) (Temporal)   Ht 188 cm (74.02\")   Wt 123 kg (272 lb 3.2 oz)   SpO2 96%   BMI 34.93 kg/m²     Physical Exam  Vitals signs and nursing note reviewed.   Constitutional:       General: He is not in acute distress.     Appearance: He is well-developed.   Neck:      Thyroid: No thyromegaly.   Cardiovascular:      Rate and Rhythm: Normal rate and regular rhythm.      Heart sounds: Normal heart sounds.   Pulmonary:      Effort: Pulmonary effort is normal.      Breath sounds: Normal breath sounds.   Skin:     General: Skin is warm and dry.   Psychiatric:         Behavior: Behavior normal.         Thought Content: Thought content normal.         Judgment: Judgment normal.        Result Review :   The following data was reviewed by: Edson " MALICK Lafleur MD on 02/02/2021:  Common labs    Common Labsle 6/24/20 6/24/20 6/24/20 1/19/21 1/19/21 1/19/21    [id number]   Glucose  113 (A)   143 (A)    BUN  13   11    Creatinine  0.88   1.04    eGFR Non  Am  87   71    eGFR African Am  105   87    Sodium  139   138    Potassium  4.1   4.4    Chloride  103   99    Calcium  9.4   9.5    Total Protein  7.2   7.3    Albumin  4.10   4.50    Total Bilirubin  0.6   0.7    Alkaline Phosphatase  58   70    AST (SGOT)  14   23    ALT (SGPT)  18   26    Total Cholesterol   184   152   Triglycerides   286 (A)   169 (A)   HDL Cholesterol   32 (A)   36 (A)   LDL Cholesterol    95   87   Hemoglobin A1C 6.10 (A)   6.90 (A)     (A) Abnormal value       Comments are available for some flowsheets but are not being displayed.                     Assessment and Plan    Problem List Items Addressed This Visit        Unprioritized    Essential hypertension (Chronic)    Pure hypercholesterolemia (Chronic)    Relevant Medications    atorvastatin (LIPITOR) 20 MG tablet    Type 2 diabetes mellitus without complication, without long-term current use of insulin (CMS/[facility]) - Primary    Relevant Medications    metFORMIN ER (GLUCOPHAGE-XR) 500 MG 24 hr tablet    Other Relevant Orders    Ambulatory Referral to Diabetic Education    Hemoglobin A1c    Comprehensive Metabolic Panel    Lipid Panel        Diabetes type 2.  New diagnosis.  We are increasing the Metformin up to 1000 mg in the morning.  I have placed an order for diabetic education.  Diabetic counseling given in great detail.  Diet and exercise and planning diet and exercise discussed in detail.  Potential pathophysiology of diabetes discussed in detail including risk of vascular disease.  I will see him back in 3 months.  Lab work prior.    Hyperlipidemia in the setting of diabetes type 2.  Recommend increasing atorvastatin up to 20 mg a day.  Checking labs prior to next visit.    Hypertension.  Overall fair  control.  Continue current medication.  Diet and exercise as above.      Follow Up   No follow-ups on file.  Patient was given instructions and counseling regarding his condition or for health maintenance advice. Please see specific information pulled into the AVS if appropriate.

## 2021-03-03 ENCOUNTER — APPOINTMENT (OUTPATIENT)
Dept: DIABETES SERVICES | Facility: HOSPITAL | Age: 66
End: 2021-03-03

## 2021-03-10 ENCOUNTER — APPOINTMENT (OUTPATIENT)
Dept: DIABETES SERVICES | Facility: HOSPITAL | Age: 66
End: 2021-03-10

## 2021-03-11 RX ORDER — METFORMIN HYDROCHLORIDE 500 MG/1
TABLET, EXTENDED RELEASE ORAL
Qty: 90 TABLET | Refills: 1 | OUTPATIENT
Start: 2021-03-11

## 2021-03-11 RX ORDER — AMLODIPINE BESYLATE AND BENAZEPRIL HYDROCHLORIDE 10; 20 MG/1; MG/1
CAPSULE ORAL
Qty: 90 CAPSULE | Refills: 1 | Status: SHIPPED | OUTPATIENT
Start: 2021-03-11 | End: 2021-10-11

## 2021-03-17 ENCOUNTER — APPOINTMENT (OUTPATIENT)
Dept: DIABETES SERVICES | Facility: HOSPITAL | Age: 66
End: 2021-03-17

## 2021-03-19 ENCOUNTER — BULK ORDERING (OUTPATIENT)
Dept: CASE MANAGEMENT | Facility: OTHER | Age: 66
End: 2021-03-19

## 2021-03-19 DIAGNOSIS — Z23 IMMUNIZATION DUE: ICD-10-CM

## 2021-03-28 DIAGNOSIS — I10 ESSENTIAL HYPERTENSION: ICD-10-CM

## 2021-03-29 RX ORDER — HYDROCHLOROTHIAZIDE 25 MG/1
TABLET ORAL
Qty: 90 TABLET | Refills: 1 | Status: SHIPPED | OUTPATIENT
Start: 2021-03-29 | End: 2021-10-11

## 2021-04-08 RX ORDER — METFORMIN HYDROCHLORIDE 500 MG/1
TABLET, EXTENDED RELEASE ORAL
Qty: 90 TABLET | Refills: 1 | OUTPATIENT
Start: 2021-04-08

## 2021-04-22 DIAGNOSIS — E11.9 TYPE 2 DIABETES MELLITUS WITHOUT COMPLICATION, WITHOUT LONG-TERM CURRENT USE OF INSULIN (HCC): ICD-10-CM

## 2021-05-04 LAB
ALBUMIN SERPL-MCNC: 4.3 G/DL (ref 3.5–5.2)
ALBUMIN/GLOB SERPL: 1.7 G/DL
ALP SERPL-CCNC: 69 U/L (ref 39–117)
ALT SERPL-CCNC: 23 U/L (ref 1–41)
AST SERPL-CCNC: 15 U/L (ref 1–40)
BILIRUB SERPL-MCNC: 0.6 MG/DL (ref 0–1.2)
BUN SERPL-MCNC: 16 MG/DL (ref 8–23)
BUN/CREAT SERPL: 16 (ref 7–25)
CALCIUM SERPL-MCNC: 9.4 MG/DL (ref 8.6–10.5)
CHLORIDE SERPL-SCNC: 101 MMOL/L (ref 98–107)
CHOLEST SERPL-MCNC: 102 MG/DL (ref 0–200)
CO2 SERPL-SCNC: 28.6 MMOL/L (ref 22–29)
CREAT SERPL-MCNC: 1 MG/DL (ref 0.76–1.27)
GLOBULIN SER CALC-MCNC: 2.5 GM/DL
GLUCOSE SERPL-MCNC: 101 MG/DL (ref 65–99)
HBA1C MFR BLD: 5.8 % (ref 4.8–5.6)
HDLC SERPL-MCNC: 35 MG/DL (ref 40–60)
LDLC SERPL CALC-MCNC: 46 MG/DL (ref 0–100)
POTASSIUM SERPL-SCNC: 3.9 MMOL/L (ref 3.5–5.2)
PROT SERPL-MCNC: 6.8 G/DL (ref 6–8.5)
SODIUM SERPL-SCNC: 138 MMOL/L (ref 136–145)
TRIGL SERPL-MCNC: 112 MG/DL (ref 0–150)
VLDLC SERPL CALC-MCNC: 21 MG/DL (ref 5–40)

## 2021-05-05 ENCOUNTER — HOSPITAL ENCOUNTER (OUTPATIENT)
Dept: DIABETES SERVICES | Facility: HOSPITAL | Age: 66
Setting detail: RECURRING SERIES
Discharge: HOME | End: 2021-05-05

## 2021-05-05 PROCEDURE — G0109 DIAB MANAGE TRN IND/GROUP: HCPCS

## 2021-05-10 ENCOUNTER — OFFICE VISIT (OUTPATIENT)
Dept: FAMILY MEDICINE CLINIC | Facility: CLINIC | Age: 66
End: 2021-05-10

## 2021-05-10 VITALS
TEMPERATURE: 97.1 F | DIASTOLIC BLOOD PRESSURE: 87 MMHG | WEIGHT: 253.5 LBS | HEART RATE: 92 BPM | SYSTOLIC BLOOD PRESSURE: 132 MMHG | BODY MASS INDEX: 32.53 KG/M2 | HEIGHT: 74 IN | OXYGEN SATURATION: 96 %

## 2021-05-10 DIAGNOSIS — E11.9 TYPE 2 DIABETES MELLITUS WITHOUT COMPLICATION, WITHOUT LONG-TERM CURRENT USE OF INSULIN (HCC): Primary | Chronic | ICD-10-CM

## 2021-05-10 DIAGNOSIS — E78.00 PURE HYPERCHOLESTEROLEMIA: Chronic | ICD-10-CM

## 2021-05-10 DIAGNOSIS — I10 ESSENTIAL HYPERTENSION: Chronic | ICD-10-CM

## 2021-05-10 PROCEDURE — 99214 OFFICE O/P EST MOD 30 MIN: CPT | Performed by: FAMILY MEDICINE

## 2021-05-10 NOTE — PROGRESS NOTES
"Chief Complaint  Diabetes (follow up )    Subjective          Daryl Decker presents to [facility]  History of Present Illness     Here for diabetes type 2 follow-up.  Is doing remarkably better.  His weight is down.  He is gone to the diabetic education classes.  We increased his Metformin up to 1000 mg a day last visit.  No GI upset.  He is cutting way back on carbohydrates.  He has not been exercising much because of his work schedule.    Hypertension.  He continues on hydrochlorothiazide 25 mg daily along with the Lotrel 10-20.  Blood pressure is minimally elevated here 132/87.  No cardiovascular symptoms.    Hyperlipidemia.  He continues on 20 mg of atorvastatin.  The LFTs are normal.  The creatinine is normal.  The LDL cholesterol is down to the 40s now.  We had increased his atorvastatin last visit from 10 up to 20 mg a day      Objective   Vital Signs:   /87   Pulse 92   Temp 97.1 °F (36.2 °C) (Temporal)   Ht 188 cm (74.02\")   Wt 115 kg (253 lb 8 oz)   SpO2 96%   BMI 32.53 kg/m²     Physical Exam  Vitals and nursing note reviewed.   Constitutional:       General: He is not in acute distress.     Appearance: He is well-developed.   Neck:      Thyroid: No thyromegaly.   Cardiovascular:      Rate and Rhythm: Normal rate and regular rhythm.      Heart sounds: Normal heart sounds.   Pulmonary:      Effort: Pulmonary effort is normal.      Breath sounds: Normal breath sounds.   Skin:     General: Skin is warm and dry.   Psychiatric:         Behavior: Behavior normal.         Thought Content: Thought content normal.         Judgment: Judgment normal.        Result Review :   The following data was reviewed by: Edson Lafleur MD on 05/10/2021:  Common labs    Common Labsle 6/24/20 6/24/20 6/24/20 1/19/21 1/19/21 1/19/21 5/4/21 5/4/21 5/4/21    0906 0906 0906 0845 0845 0845 0815 0815 0815   Glucose  113 (A)   143 (A)   101 (A)    BUN  13   11   16    Creatinine  0.88   " 1.04   1.00    eGFR Non African Am  87   71   75    eGFR  Am  105   87   91    Sodium  139   138   138    Potassium  4.1   4.4   3.9    Chloride  103   99   101    Calcium  9.4   9.5   9.4    Total Protein  7.2   7.3   6.8    Albumin  4.10   4.50   4.30    Total Bilirubin  0.6   0.7   0.6    Alkaline Phosphatase  58   70   69    AST (SGOT)  14   23   15    ALT (SGPT)  18   26   23    Total Cholesterol   184   152 102     Triglycerides   286 (A)   169 (A) 112     HDL Cholesterol   32 (A)   36 (A) 35 (A)     LDL Cholesterol    95   87 46     Hemoglobin A1C 6.10 (A)   6.90 (A)     5.80 (A)   (A) Abnormal value       Comments are available for some flowsheets but are not being displayed.                     Assessment and Plan    Diagnoses and all orders for this visit:    1. Type 2 diabetes mellitus without complication, without long-term current use of insulin (CMS/[facility]) (Primary)  -     Hemoglobin A1c; Future  -     Comprehensive Metabolic Panel; Future  -     Lipid Panel; Future    2. Essential hypertension    3. Pure hypercholesterolemia      Diabetes type 2.  Better control.  Great job with weight loss and watching carbohydrates.  He will continue the diabetic education classes.  He is going to continue the Metformin as is 1000 mg daily.  I will see him back in 6 months.  If the A1c continues to be excellent, I would recommend decreasing the Metformin.  His creatinine is normal.    Hypertension.  Overall fairly good control.  He will continue his weight loss.  Continue to monitor blood pressure at home.    Hyperlipidemia in the setting of diabetes type 2.  He continues on atorvastatin 20 mg a day.  LDL is at a great target.      Follow Up   No follow-ups on file.  Patient was given instructions and counseling regarding his condition or for health maintenance advice. Please see specific information pulled into the AVS if appropriate.

## 2021-05-12 ENCOUNTER — HOSPITAL ENCOUNTER (OUTPATIENT)
Dept: DIABETES SERVICES | Facility: HOSPITAL | Age: 66
Setting detail: RECURRING SERIES
Discharge: HOME | End: 2021-05-12

## 2021-05-12 PROCEDURE — G0109 DIAB MANAGE TRN IND/GROUP: HCPCS

## 2021-06-09 ENCOUNTER — HOSPITAL ENCOUNTER (OUTPATIENT)
Dept: DIABETES SERVICES | Facility: HOSPITAL | Age: 66
Discharge: HOME | End: 2021-06-09
Admitting: FAMILY MEDICINE

## 2021-06-09 PROCEDURE — G0109 DIAB MANAGE TRN IND/GROUP: HCPCS

## 2021-08-09 RX ORDER — ATORVASTATIN CALCIUM 20 MG/1
TABLET, FILM COATED ORAL
Qty: 90 TABLET | Refills: 1 | Status: SHIPPED | OUTPATIENT
Start: 2021-08-09 | End: 2022-01-12

## 2021-08-09 RX ORDER — METFORMIN HYDROCHLORIDE 500 MG/1
1000 TABLET, EXTENDED RELEASE ORAL
Qty: 180 TABLET | Refills: 1 | Status: SHIPPED | OUTPATIENT
Start: 2021-08-09 | End: 2022-01-12

## 2021-10-11 DIAGNOSIS — I10 ESSENTIAL HYPERTENSION: ICD-10-CM

## 2021-10-11 RX ORDER — HYDROCHLOROTHIAZIDE 25 MG/1
TABLET ORAL
Qty: 90 TABLET | Refills: 1 | Status: SHIPPED | OUTPATIENT
Start: 2021-10-11 | End: 2022-04-15

## 2021-10-11 RX ORDER — AMLODIPINE BESYLATE AND BENAZEPRIL HYDROCHLORIDE 10; 20 MG/1; MG/1
CAPSULE ORAL
Qty: 90 CAPSULE | Refills: 1 | Status: SHIPPED | OUTPATIENT
Start: 2021-10-11 | End: 2022-04-15

## 2021-10-11 NOTE — TELEPHONE ENCOUNTER
Rx Refill Note  Requested Prescriptions     Pending Prescriptions Disp Refills   • hydroCHLOROthiazide (HYDRODIURIL) 25 MG tablet [Pharmacy Med Name: HYDROCHLOROTHIAZIDE 25 MG TAB] 90 tablet 1     Sig: TAKE 1 TABLET BY MOUTH EVERY DAY   • amLODIPine-benazepril (LOTREL) 10-20 MG per capsule [Pharmacy Med Name: AMLODIPINE-BENAZEPRIL 10-20 MG] 90 capsule 1     Sig: TAKE 1 CAPSULE BY MOUTH EVERY DAY      Last office visit with prescribing clinician: 5/10/2021      Next office visit with prescribing clinician: 11/8/2021            Sari Quiroz MA  10/11/21, 09:31 EDT

## 2022-01-12 ENCOUNTER — OFFICE VISIT (OUTPATIENT)
Dept: FAMILY MEDICINE CLINIC | Facility: CLINIC | Age: 67
End: 2022-01-12

## 2022-01-12 VITALS
DIASTOLIC BLOOD PRESSURE: 89 MMHG | HEIGHT: 74 IN | OXYGEN SATURATION: 98 % | RESPIRATION RATE: 18 BRPM | BODY MASS INDEX: 30.47 KG/M2 | HEART RATE: 77 BPM | SYSTOLIC BLOOD PRESSURE: 137 MMHG | TEMPERATURE: 98 F | WEIGHT: 237.4 LBS

## 2022-01-12 DIAGNOSIS — E11.9 TYPE 2 DIABETES MELLITUS WITHOUT COMPLICATION, WITHOUT LONG-TERM CURRENT USE OF INSULIN: Chronic | ICD-10-CM

## 2022-01-12 DIAGNOSIS — I10 ESSENTIAL HYPERTENSION: Chronic | ICD-10-CM

## 2022-01-12 DIAGNOSIS — E78.00 PURE HYPERCHOLESTEROLEMIA: Chronic | ICD-10-CM

## 2022-01-12 DIAGNOSIS — Z00.00 MEDICARE ANNUAL WELLNESS VISIT, SUBSEQUENT: Primary | ICD-10-CM

## 2022-01-12 PROCEDURE — 99214 OFFICE O/P EST MOD 30 MIN: CPT | Performed by: FAMILY MEDICINE

## 2022-01-12 PROCEDURE — 1160F RVW MEDS BY RX/DR IN RCRD: CPT | Performed by: FAMILY MEDICINE

## 2022-01-12 PROCEDURE — G0439 PPPS, SUBSEQ VISIT: HCPCS | Performed by: FAMILY MEDICINE

## 2022-01-12 PROCEDURE — 1170F FXNL STATUS ASSESSED: CPT | Performed by: FAMILY MEDICINE

## 2022-01-12 RX ORDER — ROSUVASTATIN CALCIUM 10 MG/1
10 TABLET, COATED ORAL DAILY
Qty: 90 TABLET | Refills: 1 | Status: SHIPPED | OUTPATIENT
Start: 2022-01-12 | End: 2022-06-24

## 2022-01-12 RX ORDER — METFORMIN HYDROCHLORIDE 500 MG/1
500 TABLET, EXTENDED RELEASE ORAL
Start: 2022-01-12 | End: 2022-02-14

## 2022-01-12 RX ORDER — AMOXICILLIN 500 MG/1
500 CAPSULE ORAL 3 TIMES DAILY
Qty: 15 CAPSULE | Refills: 0 | Status: SHIPPED | OUTPATIENT
Start: 2022-01-12 | End: 2022-01-21 | Stop reason: SDUPTHER

## 2022-01-12 NOTE — PROGRESS NOTES
"Chief Complaint  Medicare Wellness-subsequent (yearly wellnness discuss constipation choleesterol ), Headache, GI Problem, Hyperlipidemia, and Hypertension    Subjective          Daryl Decker presents to [facility]  History of Present Illness     Diabetes type 2 follow-up.  He is done a great job losing weight.  His A1c is down to normal at 5.5%.  He continues metformin 1000 mg daily.  He is having no diarrhea.  He is having some constipation that he about 1 every 3 days.  He is not using a laxative.  He states his diet is a little change.  Otherwise feels fine.  Colonoscopy up-to-date.    Hypertension very well controlled.  Continues Lotrel and hydrochlorothiazide.    Hyperlipidemia.  His lipid panel favorable.  He is taking atorvastatin 20 mg a day.  He is concerned that atorvastatin may cause dementia.  He has read some things on it and has a friend that maybe has a.  We had a long discussion on this.  He is aware that there are no definitive scientific studies that demonstrate statins causing dementia.  But he states he is anxious about it.  He states he has had no memory changes and no trouble with executive functioning and cognition.    Sinus symptoms for few months.  Hurts sometimes when he bends over in the sinuses.  Some drainage.  No fever.  He was exposed to COVID a week or 2 ago.  But he has not had any specific symptoms other than the ongoing sinus symptoms for the last 4 months.  No visual changes with the headaches no nausea.  No focal neurological deficits.      Objective   Vital Signs:   /89 (BP Location: Left arm, Patient Position: Sitting, Cuff Size: Adult)   Pulse 77   Temp 98 °F (36.7 °C) (Temporal)   Resp 18   Ht 188 cm (74\")   Wt 108 kg (237 lb 6.4 oz)   SpO2 98%   BMI 30.48 kg/m²     Physical Exam  Constitutional:       Appearance: Normal appearance.   HENT:      Head: Atraumatic.   Eyes:      Extraocular Movements: Extraocular movements intact. "      Conjunctiva/sclera: Conjunctivae normal.      Pupils: Pupils are equal, round, and reactive to light.   Neck:      Thyroid: No thyroid mass, thyromegaly or thyroid tenderness.   Cardiovascular:      Rate and Rhythm: Normal rate and regular rhythm.      Pulses: Normal pulses.      Heart sounds: Normal heart sounds.   Pulmonary:      Effort: Pulmonary effort is normal.      Breath sounds: Normal breath sounds.   Abdominal:      General: Abdomen is flat. There is no distension.      Palpations: Abdomen is soft. There is no mass.      Tenderness: There is no abdominal tenderness.      Hernia: No hernia is present.   Musculoskeletal:         General: Normal range of motion.      Cervical back: Normal range of motion and neck supple. No muscular tenderness.   Lymphadenopathy:      Cervical: No cervical adenopathy.   Skin:     General: Skin is warm and dry.      Findings: No rash.   Neurological:      General: No focal deficit present.      Mental Status: He is alert and oriented to person, place, and time.      Comments: Neurological exam.  Pupils equal and reactive to light.  Extra ocular muscle movements intact all directions.  Face symmetric.  Gait unremarkable.  No ataxia.  No dysmetria     Psychiatric:         Mood and Affect: Mood normal.         Behavior: Behavior normal.        Result Review :   The following data was reviewed by: Edson Lafleur MD on 01/12/2022:  Common labs    Common Labsle 1/19/21 1/19/21 1/19/21 5/4/21 5/4/21 5/4/21 12/22/21 12/22/21 12/22/21    0845 0845 0845 0815 0815 0815 0806 0806 0806   Glucose  143 (A)   101 (A)   98    BUN  11   16   19    Creatinine  1.04   1.00   1.12    eGFR Non  Am  71   75   68    eGFR  Am  87   91   78    Sodium  138   138   140    Potassium  4.4   3.9   4.3    Chloride  99   101   101    Calcium  9.5   9.4   9.7    Total Protein  7.3   6.8   7.3    Albumin  4.50   4.30   4.6    Total Bilirubin  0.7   0.6   0.5    Alkaline Phosphatase  70   69    69    AST (SGOT)  23   15   15    ALT (SGPT)  26   23   20    Total Cholesterol   152 102     136   Triglycerides   169 (A) 112     106   HDL Cholesterol   36 (A) 35 (A)     41   LDL Cholesterol    87 46     75   Hemoglobin A1C 6.90 (A)     5.80 (A) 5.5     (A) Abnormal value       Comments are available for some flowsheets but are not being displayed.                     Assessment and Plan    Diagnoses and all orders for this visit:    1. Medicare annual wellness visit, subsequent (Primary)    2. Type 2 diabetes mellitus without complication, without long-term current use of insulin (HCC)    3. Pure hypercholesterolemia    4. Essential hypertension    Other orders  -     metFORMIN ER (GLUCOPHAGE-XR) 500 MG 24 hr tablet; Take 1 tablet by mouth Daily With Breakfast.  -     rosuvastatin (Crestor) 10 MG tablet; Take 1 tablet by mouth Daily.  Dispense: 90 tablet; Refill: 1  -     amoxicillin (AMOXIL) 500 MG capsule; Take 1 capsule by mouth 3 (Three) Times a Day.  Dispense: 15 capsule; Refill: 0      Diabetes type 2 with excellent control.  Decrease metformin from 1000 mg down to 500 mg a day.  See me in 6 months with lab work prior.    Hypertension.  Well-controlled.    Hyperlipidemia with anxiety with regards to atorvastatin use.  At this point we are switching over to rosuvastatin 10 mg a day.  He will call with any concerns.    Possible chronic sinusitis.  Prescribing amoxicillin for 5 days.  If not improving let us know.  With severe headaches he will seek medical attention.          Follow Up   No follow-ups on file.  Patient was given instructions and counseling regarding his condition or for health maintenance advice. Please see specific information pulled into the AVS if appropriate.

## 2022-01-12 NOTE — PROGRESS NOTES
The ABCs of the Annual Wellness Visit  Subsequent Medicare Wellness Visit    Chief Complaint   Patient presents with   • Medicare Wellness-subsequent     yearly wellnness discuss constipation choleesterol    • Headache   • GI Problem   • Hyperlipidemia   • Hypertension      Subjective    History of Present Illness:  Daryl Decker is a 66 y.o. male who presents for a Subsequent Medicare Wellness Visit.    The following portions of the patient's history were reviewed and   updated as appropriate: allergies, current medications, past family history, past medical history, past social history, past surgical history and problem list.    Compared to one year ago, the patient feels his physical   health is the same.    Compared to one year ago, the patient feels his mental   health is the same.    Recent Hospitalizations:  He was not admitted to the hospital during the last year.       Current Medical Providers:  Patient Care Team:  Edson Lafleur MD as PCP - General  Edson Lafleur MD as PCP - Family Medicine    Outpatient Medications Prior to Visit   Medication Sig Dispense Refill   • amLODIPine-benazepril (LOTREL) 10-20 MG per capsule TAKE 1 CAPSULE BY MOUTH EVERY DAY 90 capsule 1   • hydroCHLOROthiazide (HYDRODIURIL) 25 MG tablet TAKE 1 TABLET BY MOUTH EVERY DAY 90 tablet 1   • sildenafil (REVATIO) 20 MG tablet Take 20 mg by mouth 3 (Three) Times a Day.     • atorvastatin (LIPITOR) 20 MG tablet TAKE 1 TABLET BY MOUTH EVERY DAY 90 tablet 1   • metFORMIN ER (GLUCOPHAGE-XR) 500 MG 24 hr tablet TAKE 2 TABLETS BY MOUTH DAILY WITH BREAKFAST. 180 tablet 1     No facility-administered medications prior to visit.       No opioid medication identified on active medication list. I have reviewed chart for other potential  high risk medication/s and harmful drug interactions in the elderly.          Aspirin is not on active medication list.  Aspirin use is not indicated based on review of current medical condition/s. Risk of  "harm outweighs potential benefits.  .    Patient Active Problem List   Diagnosis   • Male erectile disorder   • Essential hypertension   • Peripheral neuropathy   • Tinnitus of both ears   • History of prostate cancer   • Pure hypercholesterolemia   • Type 2 diabetes mellitus without complication, without long-term current use of insulin (HCC)   • Esophageal dysphagia     Advance Care Planning  Advance Directive is not on file.  ACP discussion was declined by the patient. Patient does not have an advance directive, declines further assistance.          Objective    Vitals:    01/12/22 1531   BP: 137/89   BP Location: Left arm   Patient Position: Sitting   Cuff Size: Adult   Pulse: 77   Resp: 18   Temp: 98 °F (36.7 °C)   TempSrc: Temporal   SpO2: 98%   Weight: 108 kg (237 lb 6.4 oz)   Height: 188 cm (74\")   PainSc: 0-No pain     BMI Readings from Last 1 Encounters:   01/12/22 30.48 kg/m²   BMI is above normal parameters. Recommendations include: exercise counseling and nutrition counseling    Does the patient have evidence of cognitive impairment? No    Physical Exam  Lab Results   Component Value Date    CHLPL 136 12/22/2021    TRIG 106 12/22/2021    HDL 41 12/22/2021    LDL 75 12/22/2021    VLDL 20 12/22/2021    HGBA1C 5.5 12/22/2021            HEALTH RISK ASSESSMENT    Smoking Status:  Social History     Tobacco Use   Smoking Status Never Smoker   Smokeless Tobacco Never Used     Alcohol Consumption:  Social History     Substance and Sexual Activity   Alcohol Use Yes    Comment: 5 drinks per week     Fall Risk Screen:    KHOA Fall Risk Assessment was completed, and patient is at LOW risk for falls.Assessment completed on:1/12/2022    Depression Screening:  PHQ-2/PHQ-9 Depression Screening 1/12/2022   Little interest or pleasure in doing things 0   Feeling down, depressed, or hopeless 0   Trouble falling or staying asleep, or sleeping too much 0   Feeling tired or having little energy 0   Poor appetite or " overeating 0   Feeling bad about yourself - or that you are a failure or have let yourself or your family down 0   Trouble concentrating on things, such as reading the newspaper or watching television 0   Moving or speaking so slowly that other people could have noticed. Or the opposite - being so fidgety or restless that you have been moving around a lot more than usual 0   Thoughts that you would be better off dead, or of hurting yourself in some way 0   Total Score 0   If you checked off any problems, how difficult have these problems made it for you to do your work, take care of things at home, or get along with other people? Not difficult at all       Health Habits and Functional and Cognitive Screening:  Functional & Cognitive Status 1/12/2022   Do you have difficulty preparing food and eating? No   Do you have difficulty bathing yourself, getting dressed or grooming yourself? No   Do you have difficulty using the toilet? No   Do you have difficulty moving around from place to place? No   Do you have trouble with steps or getting out of a bed or a chair? No   Current Diet Well Balanced Diet   Dental Exam Not up to date   Eye Exam Not up to date   Exercise (times per week) 5 times per week   Current Exercises Include Walking   Current Exercise Activities Include -   Do you need help using the phone?  No   Are you deaf or do you have serious difficulty hearing?  No   Do you need help with transportation? No   Do you need help shopping? No   Do you need help preparing meals?  No   Do you need help with housework?  No   Do you need help with laundry? No   Do you need help taking your medications? No   Do you need help managing money? No   Do you ever drive or ride in a car without wearing a seat belt? Yes   Have you felt unusual stress, anger or loneliness in the last month? No   Who do you live with? Spouse   If you need help, do you have trouble finding someone available to you? No   Have you been bothered in  the last four weeks by sexual problems? No   Do you have difficulty concentrating, remembering or making decisions? No       Age-appropriate Screening Schedule:  Refer to the list below for future screening recommendations based on patient's age, sex and/or medical conditions. Orders for these recommended tests are listed in the plan section. The patient has been provided with a written plan.    Health Maintenance   Topic Date Due   • URINE MICROALBUMIN  Never done   • DIABETIC FOOT EXAM  11/18/2020   • DIABETIC EYE EXAM  02/04/2022 (Originally 2/11/2016)   • INFLUENZA VACCINE  01/12/2023 (Originally 8/1/2021)   • HEMOGLOBIN A1C  06/22/2022   • LIPID PANEL  12/22/2022   • TDAP/TD VACCINES (2 - Td or Tdap) 12/18/2027   • ZOSTER VACCINE  Completed              Assessment/Plan   CMS Preventative Services Quick Reference  Risk Factors Identified During Encounter  None Identified  The above risks/problems have been discussed with the patient.  Follow up actions/plans if indicated are seen below in the Assessment/Plan Section.  Pertinent information has been shared with the patient in the After Visit Summary.    Diagnoses and all orders for this visit:    1. Medicare annual wellness visit, subsequent (Primary)    2. Type 2 diabetes mellitus without complication, without long-term current use of insulin (HCC)    3. Pure hypercholesterolemia    4. Essential hypertension    Other orders  -     metFORMIN ER (GLUCOPHAGE-XR) 500 MG 24 hr tablet; Take 1 tablet by mouth Daily With Breakfast.  -     rosuvastatin (Crestor) 10 MG tablet; Take 1 tablet by mouth Daily.  Dispense: 90 tablet; Refill: 1  -     amoxicillin (AMOXIL) 500 MG capsule; Take 1 capsule by mouth 3 (Three) Times a Day.  Dispense: 15 capsule; Refill: 0        Follow Up:   No follow-ups on file.     An After Visit Summary and PPPS were made available to the patient.

## 2022-01-21 RX ORDER — AMOXICILLIN 500 MG/1
500 CAPSULE ORAL 3 TIMES DAILY
Qty: 15 CAPSULE | Refills: 0 | Status: SHIPPED | OUTPATIENT
Start: 2022-01-21 | End: 2022-07-12

## 2022-02-14 RX ORDER — METFORMIN HYDROCHLORIDE 500 MG/1
TABLET, EXTENDED RELEASE ORAL
Qty: 180 TABLET | Refills: 1 | Status: SHIPPED | OUTPATIENT
Start: 2022-02-14 | End: 2022-12-16

## 2022-04-15 DIAGNOSIS — I10 ESSENTIAL HYPERTENSION: ICD-10-CM

## 2022-04-15 RX ORDER — AMLODIPINE BESYLATE AND BENAZEPRIL HYDROCHLORIDE 10; 20 MG/1; MG/1
CAPSULE ORAL
Qty: 90 CAPSULE | Refills: 1 | Status: SHIPPED | OUTPATIENT
Start: 2022-04-15 | End: 2022-10-07

## 2022-04-15 RX ORDER — HYDROCHLOROTHIAZIDE 25 MG/1
TABLET ORAL
Qty: 90 TABLET | Refills: 1 | Status: SHIPPED | OUTPATIENT
Start: 2022-04-15 | End: 2022-10-24

## 2022-04-15 NOTE — TELEPHONE ENCOUNTER
Rx Refill Note  Requested Prescriptions     Pending Prescriptions Disp Refills   • hydroCHLOROthiazide (HYDRODIURIL) 25 MG tablet [Pharmacy Med Name: HYDROCHLOROTHIAZIDE 25 MG TAB] 90 tablet 1     Sig: TAKE 1 TABLET BY MOUTH EVERY DAY   • amLODIPine-benazepril (LOTREL) 10-20 MG per capsule [Pharmacy Med Name: AMLODIPINE-BENAZEPRIL 10-20 MG] 90 capsule 1     Sig: TAKE 1 CAPSULE BY MOUTH EVERY DAY      Last office visit with prescribing clinician: 1/12/2022      Next office visit with prescribing clinician: 7/12/2022            Cheo Miller  04/15/22, 11:24 EDT

## 2022-06-24 RX ORDER — ROSUVASTATIN CALCIUM 10 MG/1
TABLET, COATED ORAL
Qty: 90 TABLET | Refills: 0 | Status: SHIPPED | OUTPATIENT
Start: 2022-06-24 | End: 2022-09-30

## 2022-06-24 NOTE — TELEPHONE ENCOUNTER
Rx Refill Note  Requested Prescriptions     Pending Prescriptions Disp Refills   • rosuvastatin (CRESTOR) 10 MG tablet [Pharmacy Med Name: ROSUVASTATIN CALCIUM 10 MG TAB] 90 tablet 1     Sig: TAKE 1 TABLET BY MOUTH EVERY DAY      Last office visit with prescribing clinician: 1/12/2022      Next office visit with prescribing clinician: 7/12/2022            Cheo Miller  06/24/22, 09:02 EDT

## 2022-07-07 DIAGNOSIS — E11.9 TYPE 2 DIABETES MELLITUS WITHOUT COMPLICATION, WITHOUT LONG-TERM CURRENT USE OF INSULIN: Primary | ICD-10-CM

## 2022-07-07 DIAGNOSIS — E78.00 PURE HYPERCHOLESTEROLEMIA: ICD-10-CM

## 2022-07-07 DIAGNOSIS — Z11.59 NEED FOR HEPATITIS C SCREENING TEST: ICD-10-CM

## 2022-07-07 DIAGNOSIS — I10 ESSENTIAL HYPERTENSION: ICD-10-CM

## 2022-07-09 LAB
ALBUMIN SERPL-MCNC: 4.7 G/DL (ref 3.8–4.8)
ALBUMIN/GLOB SERPL: 1.7 {RATIO} (ref 1.2–2.2)
ALP SERPL-CCNC: 74 IU/L (ref 44–121)
ALT SERPL-CCNC: 25 IU/L (ref 0–44)
AST SERPL-CCNC: 15 IU/L (ref 0–40)
BILIRUB SERPL-MCNC: 0.6 MG/DL (ref 0–1.2)
BUN SERPL-MCNC: 15 MG/DL (ref 8–27)
BUN/CREAT SERPL: 13 (ref 10–24)
CALCIUM SERPL-MCNC: 9.6 MG/DL (ref 8.6–10.2)
CHLORIDE SERPL-SCNC: 101 MMOL/L (ref 96–106)
CHOLEST SERPL-MCNC: 119 MG/DL (ref 100–199)
CO2 SERPL-SCNC: 26 MMOL/L (ref 20–29)
CREAT SERPL-MCNC: 1.13 MG/DL (ref 0.76–1.27)
EGFRCR SERPLBLD CKD-EPI 2021: 71 ML/MIN/1.73
GLOBULIN SER CALC-MCNC: 2.7 G/DL (ref 1.5–4.5)
GLUCOSE SERPL-MCNC: 104 MG/DL (ref 65–99)
HBA1C MFR BLD: 5.7 % (ref 4.8–5.6)
HCV AB S/CO SERPL IA: <0.1 S/CO RATIO (ref 0–0.9)
HDLC SERPL-MCNC: 41 MG/DL
LDLC SERPL CALC-MCNC: 58 MG/DL (ref 0–99)
MICROALBUMIN UR-MCNC: 35.2 UG/ML
POTASSIUM SERPL-SCNC: 4.6 MMOL/L (ref 3.5–5.2)
PROT SERPL-MCNC: 7.4 G/DL (ref 6–8.5)
SODIUM SERPL-SCNC: 142 MMOL/L (ref 134–144)
TRIGL SERPL-MCNC: 105 MG/DL (ref 0–149)
VLDLC SERPL CALC-MCNC: 20 MG/DL (ref 5–40)

## 2022-07-12 ENCOUNTER — OFFICE VISIT (OUTPATIENT)
Dept: FAMILY MEDICINE CLINIC | Facility: CLINIC | Age: 67
End: 2022-07-12

## 2022-07-12 VITALS
HEIGHT: 74 IN | TEMPERATURE: 97.8 F | DIASTOLIC BLOOD PRESSURE: 80 MMHG | OXYGEN SATURATION: 99 % | HEART RATE: 85 BPM | BODY MASS INDEX: 30.15 KG/M2 | WEIGHT: 234.9 LBS | SYSTOLIC BLOOD PRESSURE: 112 MMHG

## 2022-07-12 DIAGNOSIS — E78.00 PURE HYPERCHOLESTEROLEMIA: Chronic | ICD-10-CM

## 2022-07-12 DIAGNOSIS — E11.9 TYPE 2 DIABETES MELLITUS WITHOUT COMPLICATION, WITHOUT LONG-TERM CURRENT USE OF INSULIN: Primary | Chronic | ICD-10-CM

## 2022-07-12 DIAGNOSIS — Z12.5 SCREENING PSA (PROSTATE SPECIFIC ANTIGEN): ICD-10-CM

## 2022-07-12 DIAGNOSIS — G44.83 PRIMARY COUGH HEADACHE: ICD-10-CM

## 2022-07-12 DIAGNOSIS — I10 ESSENTIAL HYPERTENSION: Chronic | ICD-10-CM

## 2022-07-12 PROCEDURE — 99214 OFFICE O/P EST MOD 30 MIN: CPT | Performed by: FAMILY MEDICINE

## 2022-07-12 NOTE — PROGRESS NOTES
"Answers for HPI/ROS submitted by the patient on 7/5/2022  What is the primary reason for your visit?: Diabetes    Chief Complaint  Headache (When coughing or sneezing has a sharp pain ) and Hyperlipidemia    Subjective        Daryl Decker presents to [facility]  History of Present Illness     Headaches.  Bilateral frontal.  For about a year.  No change in severity or frequency.  Typically occurs with coughing or sneezing.  But he is not doing a lot of either.  First off he may be a sinus infection a number of months ago.  Prescribed antibiotics.  No change.  He has no focal neurological deficits with this.  No weakness.  No numbness.  No changes in vision.  The pain is described as moderately sharp.  Lasts about 5 seconds at the most.  No sequelae.  Nuisance level.  Not a problem.  No family history of intracerebral bleeds.  Does have a family history of aortic dissection.  Patient is a never smoker.  No heavy alcohol use.    Hypertension.  Continues amlodipine benazepril and hydrochlorothiazide.  Blood pressure is looking good.    Diabetes type 2.  On 500 mg metformin daily.  Lower dose last visit.  A1c is up just slightly but A1c remains nearly normal.  He is doing a great job with diet and exercise.    Hyperlipidemia.  Continue rosuvastatin 10 mg a day.  Lipid panel favorable.  LDL lower.    Social History     Tobacco Use   • Smoking status: Never Smoker   • Smokeless tobacco: Never Used   Vaping Use   • Vaping Use: Never used   Substance Use Topics   • Alcohol use: Yes     Comment: 5 drinks per week   • Drug use: No         Objective   Vital Signs:  /80   Pulse 85   Temp 97.8 °F (36.6 °C) (Temporal)   Ht 188 cm (74\")   Wt 107 kg (234 lb 14.4 oz)   SpO2 99%   BMI 30.16 kg/m²   Estimated body mass index is 30.16 kg/m² as calculated from the following:    Height as of this encounter: 188 cm (74\").    Weight as of this encounter: 107 kg (234 lb 14.4 oz).          Physical " Exam  Vitals and nursing note reviewed.   Constitutional:       General: He is not in acute distress.     Appearance: He is well-developed.   HENT:      Head: Atraumatic.      Comments: No head neck pain to palpation     Right Ear: Tympanic membrane, ear canal and external ear normal.      Left Ear: Tympanic membrane, ear canal and external ear normal.   Eyes:      Extraocular Movements: Extraocular movements intact.      Pupils: Pupils are equal, round, and reactive to light.   Cardiovascular:      Rate and Rhythm: Normal rate and regular rhythm.      Heart sounds: Normal heart sounds.   Pulmonary:      Effort: Pulmonary effort is normal.      Breath sounds: Normal breath sounds.   Musculoskeletal:      Cervical back: Normal range of motion.   Skin:     General: Skin is warm and dry.   Neurological:      Comments: Neurological exam.  Pupils equal and reactive to light.  Extra ocular muscle movements intact all directions.  Face symmetric.  Gait unremarkable.  No ataxia.  No dysmetria     Psychiatric:         Mood and Affect: Mood normal.        Result Review :  The following data was reviewed by: Edson Lafleur MD on 07/12/2022:  Common labs    Common Labsle 12/22/21 12/22/21 12/22/21 7/7/22 7/7/22 7/7/22 7/7/22    0806 0806 0806 0850 0850 0850 0850   Glucose  98    104 (A)    BUN  19    15    Creatinine  1.12    1.13    eGFR Non  Am  68        eGFR African Am  78        Sodium  140    142    Potassium  4.3    4.6    Chloride  101    101    Calcium  9.7    9.6    Total Protein  7.3    7.4    Albumin  4.6    4.7    Total Bilirubin  0.5    0.6    Alkaline Phosphatase  69    74    AST (SGOT)  15    15    ALT (SGPT)  20    25    Total Cholesterol   136    119   Triglycerides   106    105   HDL Cholesterol   41    41   LDL Cholesterol    75    58   Hemoglobin A1C 5.5   5.7 (A)      Microalbumin, Urine     35.2     (A) Abnormal value       Comments are available for some flowsheets but are not being displayed.                      Assessment and Plan   Diagnoses and all orders for this visit:    1. Type 2 diabetes mellitus without complication, without long-term current use of insulin (HCC) (Primary)  -     Comprehensive Metabolic Panel; Future  -     CBC & Differential; Future  -     Lipid Panel; Future  -     Hemoglobin A1c; Future    2. Pure hypercholesterolemia    3. Essential hypertension    4. Primary cough headache  -     CBC & Differential; Future    5. Screening PSA (prostate specific antigen)  -     PSA Screen; Future      Diabetes type 2.  Excellent control.  Continue metformin as is.  500 mg once a day not twice a day.  Continue exercise and diet.    Headache.  With coughing and sneezing.  Likely related to this and this only.  Low probability of significant pathology.  At this point no absolute indication for further imaging.  We did discuss getting a MR angiogram, but I believe the benefits of the angiogram would not outweigh the risk of reaction to the IV dye.  Very likely not a cerebral aneurysm.  Very likely not intracranial tumor.  I recommend observation.  If the pain starts to get dramatically worse he is going to seek medical attention, emergency room with severe discomfort.  Possible chronic sinus inflammation.    Hyperlipidemia.  Continue rosuvastatin for    Hypertension.  Controlled.    Follow-up in 6 months for Medicare wellness visit           Follow Up   No follow-ups on file.  Patient was given instructions and counseling regarding his condition or for health maintenance advice. Please see specific information pulled into the AVS if appropriate.

## 2022-09-30 RX ORDER — ROSUVASTATIN CALCIUM 10 MG/1
TABLET, COATED ORAL
Qty: 90 TABLET | Refills: 1 | Status: SHIPPED | OUTPATIENT
Start: 2022-09-30

## 2022-10-07 RX ORDER — AMLODIPINE BESYLATE AND BENAZEPRIL HYDROCHLORIDE 10; 20 MG/1; MG/1
CAPSULE ORAL
Qty: 90 CAPSULE | Refills: 1 | Status: SHIPPED | OUTPATIENT
Start: 2022-10-07

## 2022-10-07 NOTE — TELEPHONE ENCOUNTER
Rx Refill Note  Requested Prescriptions     Pending Prescriptions Disp Refills   • amLODIPine-benazepril (LOTREL) 10-20 MG per capsule [Pharmacy Med Name: AMLODIPINE-BENAZEPRIL 10-20 MG] 90 capsule 1     Sig: TAKE 1 CAPSULE BY MOUTH EVERY DAY      Last office visit with prescribing clinician: 7/12/2022      Next office visit with prescribing clinician: 1/12/2023            Cheo Miller  10/07/22, 08:26 EDT

## 2022-10-24 DIAGNOSIS — I10 ESSENTIAL HYPERTENSION: ICD-10-CM

## 2022-10-24 RX ORDER — HYDROCHLOROTHIAZIDE 25 MG/1
TABLET ORAL
Qty: 90 TABLET | Refills: 1 | Status: SHIPPED | OUTPATIENT
Start: 2022-10-24

## 2022-10-24 NOTE — TELEPHONE ENCOUNTER
Rx Refill Note  Requested Prescriptions     Pending Prescriptions Disp Refills   • hydroCHLOROthiazide (HYDRODIURIL) 25 MG tablet [Pharmacy Med Name: HYDROCHLOROTHIAZIDE 25 MG TAB] 90 tablet 1     Sig: TAKE 1 TABLET BY MOUTH EVERY DAY      Last office visit with prescribing clinician: 7/12/2022      Next office visit with prescribing clinician: 1/12/2023       {TIP  Please add Last Relevant Lab Date if appropriate: 7/7/2022    ADAM Villela  10/24/22, 14:29 EDT

## 2022-12-16 RX ORDER — METFORMIN HYDROCHLORIDE 500 MG/1
TABLET, EXTENDED RELEASE ORAL
Qty: 180 TABLET | Refills: 1 | Status: SHIPPED | OUTPATIENT
Start: 2022-12-16 | End: 2023-01-12

## 2022-12-16 NOTE — TELEPHONE ENCOUNTER
Rx Refill Note  Requested Prescriptions     Pending Prescriptions Disp Refills   • metFORMIN ER (GLUCOPHAGE-XR) 500 MG 24 hr tablet [Pharmacy Med Name: METFORMIN HCL  MG TABLET] 180 tablet 1     Sig: TAKE 2 TABLETS BY MOUTH DAILY WITH BREAKFAST.      Last office visit with prescribing clinician: 7/12/2022   Last telemedicine visit with prescribing clinician: 1/5/2023   Next office visit with prescribing clinician: 1/12/2023                         Would you like a call back once the refill request has been completed: [] Yes [] No    If the office needs to give you a call back, can they leave a voicemail: [] Yes [] No    Cheo Miller  12/16/22, 12:57 EST

## 2023-01-12 ENCOUNTER — OFFICE VISIT (OUTPATIENT)
Dept: FAMILY MEDICINE CLINIC | Facility: CLINIC | Age: 68
End: 2023-01-12
Payer: MEDICARE

## 2023-01-12 VITALS
SYSTOLIC BLOOD PRESSURE: 137 MMHG | HEIGHT: 74 IN | TEMPERATURE: 97.1 F | OXYGEN SATURATION: 95 % | WEIGHT: 249.3 LBS | DIASTOLIC BLOOD PRESSURE: 83 MMHG | BODY MASS INDEX: 31.99 KG/M2 | HEART RATE: 93 BPM

## 2023-01-12 DIAGNOSIS — E11.9 TYPE 2 DIABETES MELLITUS WITHOUT COMPLICATION, WITHOUT LONG-TERM CURRENT USE OF INSULIN: Primary | Chronic | ICD-10-CM

## 2023-01-12 DIAGNOSIS — I10 ESSENTIAL HYPERTENSION: Chronic | ICD-10-CM

## 2023-01-12 DIAGNOSIS — E78.00 PURE HYPERCHOLESTEROLEMIA: Chronic | ICD-10-CM

## 2023-01-12 DIAGNOSIS — G62.9 PERIPHERAL POLYNEUROPATHY: Chronic | ICD-10-CM

## 2023-01-12 PROCEDURE — 99213 OFFICE O/P EST LOW 20 MIN: CPT | Performed by: FAMILY MEDICINE

## 2023-01-12 RX ORDER — METFORMIN HYDROCHLORIDE 500 MG/1
500 TABLET, EXTENDED RELEASE ORAL
Qty: 90 TABLET | Refills: 1 | Status: SHIPPED | OUTPATIENT
Start: 2023-01-12

## 2023-01-12 NOTE — PROGRESS NOTES
"Chief Complaint  Diabetes    Subjective        Daryl Decker presents to [facility]  History of Present Illness    Diabetes type 2.  Doing well on metformin XR release 500 mg once a day.  A1c remains very low at 5.7%.  Recently fasting 119.  Creatinine normal.  He is on remarkably good job with weight loss and exercise.  Although he states he gained some weight over the holidays.    Hypertension.  Blood pressure is minimally elevated today.  He continues on amlodipine benazepril 10-20 and hydrochlorothiazide 25 mg a day.  Potassium normal.    Hyperlipidemia.  He continues on rosuvastatin 10 mg a day.  Lipid panel overall unchanged.    Ongoing lower extremity peripheral neuropathy.  Mostly a number like feeling.  No discomfort.  Not worse.      Objective   Vital Signs:  /83   Pulse 93   Temp 97.1 °F (36.2 °C) (Temporal)   Ht 188 cm (74\")   Wt 113 kg (249 lb 4.8 oz)   SpO2 95%   BMI 32.01 kg/m²   Estimated body mass index is 32.01 kg/m² as calculated from the following:    Height as of this encounter: 188 cm (74\").    Weight as of this encounter: 113 kg (249 lb 4.8 oz).             Physical Exam  Vitals and nursing note reviewed.   Constitutional:       General: He is not in acute distress.     Appearance: He is well-developed.   Cardiovascular:      Rate and Rhythm: Normal rate and regular rhythm.      Heart sounds: Normal heart sounds.   Pulmonary:      Effort: Pulmonary effort is normal.      Breath sounds: Normal breath sounds.   Musculoskeletal:      Cervical back: Normal range of motion.      Comments: Diabetic foot exam unremarkable.  Sensation intact.  No lesions.   Skin:     General: Skin is warm and dry.   Psychiatric:         Mood and Affect: Mood normal.        Result Review :  The following data was reviewed by: Edson Lafleur MD on 01/12/2023:  Common labs    Common Labs 7/7/22 7/7/22 7/7/22 7/7/22 1/5/23 1/5/23 1/5/23 1/5/23 1/5/23    0850 0850 0850 0850 " 0819 0819 0819 0819 0819   Glucose   104 (A)  119 (A)       BUN   15  18       Creatinine   1.13  1.03       Sodium   142  136       Potassium   4.6  4.2       Chloride   101  100       Calcium   9.6  9.2       Total Protein   7.4  7.3       Albumin   4.7  4.6       Total Bilirubin   0.6  0.4       Alkaline Phosphatase   74  65       AST (SGOT)   15  16       ALT (SGPT)   25  23       WBC      5.90      Hemoglobin      15.0      Hematocrit      42.4      Platelets      205      Total Cholesterol    119   127     Triglycerides    105   101     HDL Cholesterol    41   43     LDL Cholesterol     58   65     Hemoglobin A1C 5.7 (A)        5.70 (A)   Microalbumin, Urine  35.2          PSA        0.187    (A) Abnormal value       Comments are available for some flowsheets but are not being displayed.                        Assessment and Plan   Diagnoses and all orders for this visit:    1. Type 2 diabetes mellitus without complication, without long-term current use of insulin (HCC) (Primary)  -     Hemoglobin A1c; Future  -     Comprehensive Metabolic Panel; Future  -     Lipid Panel; Future    2. Essential hypertension    3. Peripheral polyneuropathy    4. Pure hypercholesterolemia    Other orders  -     metFORMIN ER (GLUCOPHAGE-XR) 500 MG 24 hr tablet; Take 1 tablet by mouth Daily With Breakfast.  Dispense: 90 tablet; Refill: 1      Diabetes type 2.  Excellent control.  Continue metformin 1 a day.  I will see him back in 6 months for Medicare wellness visit with lab work prior.         Follow Up   No follow-ups on file.  Patient was given instructions and counseling regarding his condition or for health maintenance advice. Please see specific information pulled into the AVS if appropriate.

## 2023-04-10 RX ORDER — ROSUVASTATIN CALCIUM 10 MG/1
TABLET, COATED ORAL
Qty: 90 TABLET | Refills: 1 | Status: SHIPPED | OUTPATIENT
Start: 2023-04-10

## 2023-04-10 NOTE — TELEPHONE ENCOUNTER
Rx Refill Note  Requested Prescriptions     Pending Prescriptions Disp Refills   • rosuvastatin (CRESTOR) 10 MG tablet [Pharmacy Med Name: ROSUVASTATIN CALCIUM 10 MG TAB] 90 tablet 1     Sig: TAKE 1 TABLET BY MOUTH EVERY DAY      Last office visit with prescribing clinician: 1/12/2023   Last telemedicine visit with prescribing clinician: 7/11/2023   Next office visit with prescribing clinician: 7/13/2023                         Would you like a call back once the refill request has been completed: [] Yes [] No    If the office needs to give you a call back, can they leave a voicemail: [] Yes [] No    Cheo Miller  04/10/23, 10:21 EDT

## 2023-05-02 DIAGNOSIS — I10 ESSENTIAL HYPERTENSION: ICD-10-CM

## 2023-05-02 RX ORDER — HYDROCHLOROTHIAZIDE 25 MG/1
TABLET ORAL
Qty: 90 TABLET | Refills: 1 | Status: SHIPPED | OUTPATIENT
Start: 2023-05-02

## 2023-05-02 NOTE — TELEPHONE ENCOUNTER
Rx Refill Note  Requested Prescriptions     Pending Prescriptions Disp Refills   • hydroCHLOROthiazide (HYDRODIURIL) 25 MG tablet [Pharmacy Med Name: HYDROCHLOROTHIAZIDE 25 MG TAB] 90 tablet 1     Sig: TAKE 1 TABLET BY MOUTH EVERY DAY      Last office visit with prescribing clinician: 1/12/2023   Last telemedicine visit with prescribing clinician: 7/11/2023   Next office visit with prescribing clinician: 7/13/2023                         Would you like a call back once the refill request has been completed: [] Yes [] No    If the office needs to give you a call back, can they leave a voicemail: [] Yes [] No    Cheo Miller  05/02/23, 09:39 EDT

## 2023-06-08 NOTE — TELEPHONE ENCOUNTER
Rx Refill Note  Requested Prescriptions     Pending Prescriptions Disp Refills    amLODIPine-benazepril (LOTREL) 10-20 MG per capsule [Pharmacy Med Name: AMLODIPINE-BENAZEPRIL 10-20 MG] 90 capsule 1     Sig: TAKE 1 CAPSULE BY MOUTH EVERY DAY      Last office visit with prescribing clinician: 1/12/2023   Last telemedicine visit with prescribing clinician: Visit date not found   Next office visit with prescribing clinician: 7/13/2023                         Would you like a call back once the refill request has been completed: [] Yes [] No    If the office needs to give you a call back, can they leave a voicemail: [] Yes [] No    Cheo Miller  06/08/23, 11:32 EDT

## 2023-06-09 RX ORDER — AMLODIPINE BESYLATE AND BENAZEPRIL HYDROCHLORIDE 10; 20 MG/1; MG/1
CAPSULE ORAL
Qty: 90 CAPSULE | Refills: 1 | Status: SHIPPED | OUTPATIENT
Start: 2023-06-09

## 2023-07-27 ENCOUNTER — OFFICE VISIT (OUTPATIENT)
Dept: FAMILY MEDICINE CLINIC | Facility: CLINIC | Age: 68
End: 2023-07-27
Payer: MEDICARE

## 2023-07-27 VITALS
HEART RATE: 89 BPM | HEIGHT: 74 IN | SYSTOLIC BLOOD PRESSURE: 116 MMHG | BODY MASS INDEX: 32.47 KG/M2 | DIASTOLIC BLOOD PRESSURE: 78 MMHG | TEMPERATURE: 97.1 F | WEIGHT: 253 LBS | OXYGEN SATURATION: 97 %

## 2023-07-27 DIAGNOSIS — B02.9 HERPES ZOSTER WITHOUT COMPLICATION: Primary | ICD-10-CM

## 2023-07-27 PROCEDURE — 3078F DIAST BP <80 MM HG: CPT | Performed by: NURSE PRACTITIONER

## 2023-07-27 PROCEDURE — 3044F HG A1C LEVEL LT 7.0%: CPT | Performed by: NURSE PRACTITIONER

## 2023-07-27 PROCEDURE — 3074F SYST BP LT 130 MM HG: CPT | Performed by: NURSE PRACTITIONER

## 2023-07-27 PROCEDURE — 99213 OFFICE O/P EST LOW 20 MIN: CPT | Performed by: NURSE PRACTITIONER

## 2023-07-27 RX ORDER — VALACYCLOVIR HYDROCHLORIDE 1 G/1
1000 TABLET, FILM COATED ORAL 3 TIMES DAILY
Qty: 21 TABLET | Refills: 0 | Status: SHIPPED | OUTPATIENT
Start: 2023-07-27 | End: 2023-08-03

## 2023-07-27 RX ORDER — CLOTRIMAZOLE AND BETAMETHASONE DIPROPIONATE 10; .64 MG/G; MG/G
1 CREAM TOPICAL 2 TIMES DAILY
Qty: 45 G | Refills: 0 | Status: SHIPPED | OUTPATIENT
Start: 2023-07-27

## 2023-07-27 NOTE — PROGRESS NOTES
"Chief Complaint  Rash (C/o Rash RLQ since Monday, mild itching, warm to touch)    Subjective        Daryl Decker presents to [facility]  History of Present Illness  Pt complaining of rash on back since Monday  Rash has gotten worse, but less painful. Red and scaly-after hot bath, starting to itch slightly. Has been putting eczema cream on it and has made it feel better. Not itchy.       Objective   Vital Signs:  /78   Pulse 89   Temp 97.1 °F (36.2 °C)   Ht 188 cm (74\")   Wt 115 kg (253 lb)   SpO2 97%   BMI 32.48 kg/m²   Estimated body mass index is 32.48 kg/m² as calculated from the following:    Height as of this encounter: 188 cm (74\").    Weight as of this encounter: 115 kg (253 lb).               Physical Exam  Constitutional:       Appearance: Normal appearance.   Skin:     General: Skin is warm.      Capillary Refill: Capillary refill takes less than 2 seconds.      Findings: Rash is scaling.          Neurological:      General: No focal deficit present.      Mental Status: He is alert and oriented to person, place, and time.   Psychiatric:         Mood and Affect: Mood normal.         Behavior: Behavior normal.      Result Review :        Scaly rash on erythematous base to lower back, does not cross midline. Warm to the touch.          Assessment and Plan   Diagnoses and all orders for this visit:    1. Herpes zoster without complication (Primary)  -     valACYclovir (Valtrex) 1000 MG tablet; Take 1 tablet by mouth 3 (Three) Times a Day for 7 days.  Dispense: 21 tablet; Refill: 0    Other orders  -     clotrimazole-betamethasone (Lotrisone) 1-0.05 % cream; Apply 1 application  topically to the appropriate area as directed 2 (Two) Times a Day.  Dispense: 45 g; Refill: 0    Suspect shingles.  Rash is scaly and initially had skin pain, not itchy as eczema was previously.  He is over 72 hrs of rash and there are no scabs.  Does not cross midline.  Pt is vaccinated.  " Discussed pt may not benefit from valtrex starting this late, but probably not harmful.      Pt to begin Valtrex TID for seven days for rash. Will use OTC topical steroid cream prn if there is no open area to rash. Will f/u with primary care provider. Refill given for topical eczema cream. Educated on s/s of shingles. Will contact the office sooner if there are any changes or if symptoms worsen.        Follow Up   No follow-ups on file.  Patient was given instructions and counseling regarding his condition or for health maintenance advice. Please see specific information pulled into the AVS if appropriate.     Student present and participated in some aspects of exam/HPI with pt consent.  Pt was seen and examined by me.  DX, MGMT all discussed personally with patient. Documentation done by myself.

## 2023-11-17 RX ORDER — ROSUVASTATIN CALCIUM 10 MG/1
TABLET, COATED ORAL
Qty: 90 TABLET | Refills: 1 | Status: SHIPPED | OUTPATIENT
Start: 2023-11-17

## 2023-11-17 NOTE — TELEPHONE ENCOUNTER
Rx Refill Note  Requested Prescriptions     Pending Prescriptions Disp Refills    rosuvastatin (CRESTOR) 10 MG tablet [Pharmacy Med Name: ROSUVASTATIN CALCIUM 10 MG TAB] 90 tablet 1     Sig: TAKE 1 TABLET BY MOUTH EVERY DAY      Last office visit with prescribing clinician: 7/13/2023   Last telemedicine visit with prescribing clinician: Visit date not found   Next office visit with prescribing clinician: 1/19/2024                         Would you like a call back once the refill request has been completed: [] Yes [] No    If the office needs to give you a call back, can they leave a voicemail: [] Yes [] No    Cheo Miller  11/17/23, 08:08 EST

## 2023-11-29 DIAGNOSIS — I10 ESSENTIAL HYPERTENSION: ICD-10-CM

## 2023-11-29 RX ORDER — METFORMIN HYDROCHLORIDE 500 MG/1
500 TABLET, EXTENDED RELEASE ORAL
Qty: 90 TABLET | Refills: 1 | Status: SHIPPED | OUTPATIENT
Start: 2023-11-29

## 2023-11-29 RX ORDER — HYDROCHLOROTHIAZIDE 25 MG/1
TABLET ORAL
Qty: 90 TABLET | Refills: 1 | Status: SHIPPED | OUTPATIENT
Start: 2023-11-29

## 2023-11-29 NOTE — TELEPHONE ENCOUNTER
Rx Refill Note  Requested Prescriptions     Pending Prescriptions Disp Refills    hydroCHLOROthiazide (HYDRODIURIL) 25 MG tablet [Pharmacy Med Name: HYDROCHLOROTHIAZIDE 25 MG TAB] 90 tablet 1     Sig: TAKE 1 TABLET BY MOUTH EVERY DAY    metFORMIN ER (GLUCOPHAGE-XR) 500 MG 24 hr tablet 90 tablet 1     Sig: Take 1 tablet by mouth Daily With Breakfast.      Last office visit with prescribing clinician: 7/13/2023   Last telemedicine visit with prescribing clinician: Visit date not found   Next office visit with prescribing clinician: 1/19/2024                         Would you like a call back once the refill request has been completed: [] Yes [] No    If the office needs to give you a call back, can they leave a voicemail: [] Yes [] No    Cheo Miller  11/29/23, 09:13 EST

## 2024-01-16 NOTE — TELEPHONE ENCOUNTER
Rx Refill Note  Requested Prescriptions     Pending Prescriptions Disp Refills    amLODIPine-benazepril (LOTREL) 10-20 MG per capsule [Pharmacy Med Name: AMLODIPINE-BENAZEPRIL 10-20 MG] 90 capsule 1     Sig: TAKE 1 CAPSULE BY MOUTH EVERY DAY      Last office visit with prescribing clinician: 7/13/2023   Last telemedicine visit with prescribing clinician: Visit date not found   Next office visit with prescribing clinician: 1/26/2024       {TIP  Please add Last Relevant Lab Date if appropriate: 01/15/23                 Would you like a call back once the refill request has been completed: [] Yes [] No    If the office needs to give you a call back, can they leave a voicemail: [] Yes [] No    Aviva Acevedo MA  01/16/24, 16:27 EST

## 2024-01-17 RX ORDER — AMLODIPINE BESYLATE AND BENAZEPRIL HYDROCHLORIDE 10; 20 MG/1; MG/1
CAPSULE ORAL
Qty: 90 CAPSULE | Refills: 1 | Status: SHIPPED | OUTPATIENT
Start: 2024-01-17

## 2024-01-26 ENCOUNTER — OFFICE VISIT (OUTPATIENT)
Dept: FAMILY MEDICINE CLINIC | Facility: CLINIC | Age: 69
End: 2024-01-26
Payer: MEDICARE

## 2024-01-26 VITALS
BODY MASS INDEX: 32.23 KG/M2 | HEIGHT: 74 IN | WEIGHT: 251.1 LBS | SYSTOLIC BLOOD PRESSURE: 131 MMHG | HEART RATE: 86 BPM | DIASTOLIC BLOOD PRESSURE: 79 MMHG | OXYGEN SATURATION: 98 % | RESPIRATION RATE: 16 BRPM

## 2024-01-26 DIAGNOSIS — E78.00 PURE HYPERCHOLESTEROLEMIA: ICD-10-CM

## 2024-01-26 DIAGNOSIS — I10 ESSENTIAL HYPERTENSION: ICD-10-CM

## 2024-01-26 DIAGNOSIS — E11.9 TYPE 2 DIABETES MELLITUS WITHOUT COMPLICATION, WITHOUT LONG-TERM CURRENT USE OF INSULIN: Primary | ICD-10-CM

## 2024-01-26 RX ORDER — METFORMIN HYDROCHLORIDE 500 MG/1
1000 TABLET, EXTENDED RELEASE ORAL
Qty: 180 TABLET | Refills: 1 | Status: SHIPPED | OUTPATIENT
Start: 2024-01-26

## 2024-01-26 NOTE — PROGRESS NOTES
"Chief Complaint  Diabetes, Results, and Hypertension    Subjective        Daryl Decker presents to [facility]  History of Present Illness    Diabetes type 2.  Continues metformin extended release 500 mg daily.  Previously at higher dose will be lowered after his A1c nearly normalized.  He states his diet is been only fair.  He is getting into the busy tax season not exercising as much.  No GI upset from the metformin ever.  He continues with a normal creatinine.  Recent fasting glucose 118.    Hypertension.  Blood pressure mildly elevated today.  He is not checking at home.  Continues on Lotrel and hydrochlorothiazide.  Potassium normal.    Hyperlipidemia.  Continues rosuvastatin.  Lipid panel overall favorable with the exception of a low HDL.  He is not exercising much.      Objective   Vital Signs:  /79   Pulse 86   Resp 16   Ht 188 cm (74\")   Wt 114 kg (251 lb 1.6 oz)   SpO2 98%   BMI 32.24 kg/m²   Estimated body mass index is 32.24 kg/m² as calculated from the following:    Height as of this encounter: 188 cm (74\").    Weight as of this encounter: 114 kg (251 lb 1.6 oz).               Physical Exam  Vitals and nursing note reviewed.   Constitutional:       General: He is not in acute distress.     Appearance: He is well-developed.   Cardiovascular:      Rate and Rhythm: Normal rate and regular rhythm.      Heart sounds: Normal heart sounds.   Pulmonary:      Effort: Pulmonary effort is normal.      Breath sounds: Normal breath sounds.   Skin:     General: Skin is warm and dry.   Psychiatric:         Mood and Affect: Mood normal.        Result Review :    The following data was reviewed by: Edson Lafleur MD on 01/26/2024:  Common labs          7/11/2023    08:51 1/15/2024    08:45   Common Labs   Glucose 122  118    BUN 21  21    Creatinine 1.15  1.16    Sodium 140  138    Potassium 4.1  4.2    Chloride 103  101    Calcium 10.5  9.6    Total Protein 7.5  6.7  "   Albumin 4.6  4.5    Total Bilirubin 0.6  0.4    Alkaline Phosphatase 64  68    AST (SGOT) 25  14    ALT (SGPT) 26  23    WBC  7.87    Hemoglobin  14.6    Hematocrit  44.4    Platelets  283    Total Cholesterol 129  111    Triglycerides 128  64    HDL Cholesterol 41  39    LDL Cholesterol  65  58    Hemoglobin A1C 6.10  6.10                   Assessment and Plan     Diagnoses and all orders for this visit:    1. Type 2 diabetes mellitus without complication, without long-term current use of insulin (Primary)    2. Essential hypertension    3. Pure hypercholesterolemia    Other orders  -     metFORMIN ER (GLUCOPHAGE-XR) 500 MG 24 hr tablet; Take 2 tablets by mouth Daily With Breakfast.  Dispense: 180 tablet; Refill: 1      Diabetes type 2.  Increase metformin to 1000 mg in the morning.  Watch out for GI upset.  Has not had in the past.  I will see him back in about 5 or 6 months.  Lab work prior.    Hypertension.  May need better control.  Verbal and written instructions given on how to check blood pressure at home and send me numbers.    Hyperlipidemia.  Continue rosuvastatin.         Follow Up     No follow-ups on file.  Patient was given instructions and counseling regarding his condition or for health maintenance advice. Please see specific information pulled into the AVS if appropriate.

## 2024-05-10 RX ORDER — ROSUVASTATIN CALCIUM 10 MG/1
TABLET, COATED ORAL
Qty: 90 TABLET | Refills: 1 | Status: SHIPPED | OUTPATIENT
Start: 2024-05-10

## 2024-06-04 DIAGNOSIS — I10 ESSENTIAL HYPERTENSION: ICD-10-CM

## 2024-06-04 RX ORDER — HYDROCHLOROTHIAZIDE 25 MG/1
TABLET ORAL
Qty: 90 TABLET | Refills: 1 | Status: SHIPPED | OUTPATIENT
Start: 2024-06-04

## 2024-06-24 ENCOUNTER — OFFICE VISIT (OUTPATIENT)
Dept: FAMILY MEDICINE CLINIC | Facility: CLINIC | Age: 69
End: 2024-06-24
Payer: MEDICARE

## 2024-06-24 VITALS
HEIGHT: 74 IN | OXYGEN SATURATION: 96 % | DIASTOLIC BLOOD PRESSURE: 89 MMHG | SYSTOLIC BLOOD PRESSURE: 139 MMHG | TEMPERATURE: 97.5 F | WEIGHT: 260.1 LBS | BODY MASS INDEX: 33.38 KG/M2 | HEART RATE: 83 BPM

## 2024-06-24 DIAGNOSIS — I10 ESSENTIAL HYPERTENSION: ICD-10-CM

## 2024-06-24 DIAGNOSIS — E11.9 TYPE 2 DIABETES MELLITUS WITHOUT COMPLICATION, WITHOUT LONG-TERM CURRENT USE OF INSULIN: Primary | ICD-10-CM

## 2024-06-24 DIAGNOSIS — E78.00 PURE HYPERCHOLESTEROLEMIA: ICD-10-CM

## 2024-06-24 PROCEDURE — 3075F SYST BP GE 130 - 139MM HG: CPT | Performed by: FAMILY MEDICINE

## 2024-06-24 PROCEDURE — 99214 OFFICE O/P EST MOD 30 MIN: CPT | Performed by: FAMILY MEDICINE

## 2024-06-24 PROCEDURE — 3044F HG A1C LEVEL LT 7.0%: CPT | Performed by: FAMILY MEDICINE

## 2024-06-24 PROCEDURE — 3079F DIAST BP 80-89 MM HG: CPT | Performed by: FAMILY MEDICINE

## 2024-06-24 PROCEDURE — 1126F AMNT PAIN NOTED NONE PRSNT: CPT | Performed by: FAMILY MEDICINE

## 2024-06-24 RX ORDER — METFORMIN HYDROCHLORIDE 500 MG/1
1000 TABLET, EXTENDED RELEASE ORAL
Qty: 360 TABLET | Refills: 1 | Status: SHIPPED | OUTPATIENT
Start: 2024-06-24

## 2024-06-24 NOTE — PROGRESS NOTES
"Chief Complaint  Diabetes    Subjective        Daryl Decker presents to [facility]  History of Present Illness    Diabetes type 2 follow-up.  Continues on metformin extended release 1000 mg in the morning.  Some loose stool but it does not bother him.  His creatinine is normal.  Recent glucose 140.  A1c up to 6.7%.  He states he has not been following his diet.  He has had some travel.  Some stressors.  He states he is going to turn things around.    Hypertension.  Continues Lotrel and hydrochlorothiazide.  Creatinine potassium and sodium normal.  Blood pressure overall acceptable today but not perfect.    Hyperlipidemia in the setting diabetes type 2.  Continues on rosuvastatin 10 mg daily.  Triglycerides are up slightly but otherwise lab work is stable.    Objective   Vital Signs:  /89   Pulse 83   Temp 97.5 °F (36.4 °C) (Temporal)   Ht 188 cm (74\")   Wt 118 kg (260 lb 1.6 oz)   SpO2 96%   BMI 33.39 kg/m²   Estimated body mass index is 33.39 kg/m² as calculated from the following:    Height as of this encounter: 188 cm (74\").    Weight as of this encounter: 118 kg (260 lb 1.6 oz).               Physical Exam  Vitals and nursing note reviewed.   Constitutional:       General: He is not in acute distress.     Appearance: He is well-developed.   Cardiovascular:      Rate and Rhythm: Normal rate and regular rhythm.      Heart sounds: Normal heart sounds.   Pulmonary:      Effort: Pulmonary effort is normal.      Breath sounds: Normal breath sounds.   Skin:     General: Skin is warm and dry.   Psychiatric:         Mood and Affect: Mood normal.        Result Review :    The following data was reviewed by: Edson Lafleur MD on 06/24/2024:  Common labs          7/11/2023    08:51 1/15/2024    08:45 6/19/2024    08:20 6/19/2024    08:23   Common Labs   Glucose 122  118  140     BUN 21  21  19     Creatinine 1.15  1.16  1.14     Sodium 140  138  137     Potassium 4.1  4.2  4.2  "    Chloride 103  101  99     Calcium 10.5  9.6  9.5     Total Protein 7.5  6.7  7.2     Albumin 4.6  4.5  4.4     Total Bilirubin 0.6  0.4  0.4     Alkaline Phosphatase 64  68  68     AST (SGOT) 25  14  19     ALT (SGPT) 26  23  25     WBC  7.87      Hemoglobin  14.6      Hematocrit  44.4      Platelets  283      Total Cholesterol 129  111  130     Triglycerides 128  64  156     HDL Cholesterol 41  39  35     LDL Cholesterol  65  58  68     Hemoglobin A1C 6.10  6.10   6.70                   Assessment and Plan     Diagnoses and all orders for this visit:    1. Type 2 diabetes mellitus without complication, without long-term current use of insulin (Primary)  -     Hemoglobin A1c; Future  -     Comprehensive Metabolic Panel; Future  -     Lipid Panel; Future  -     Microalbumin / Creatinine Urine Ratio - Urine, Clean Catch; Future    2. Essential hypertension    3. Pure hypercholesterolemia    Other orders  -     metFORMIN ER (GLUCOPHAGE-XR) 500 MG 24 hr tablet; Take 2 tablets by mouth Daily With Breakfast & Dinner.  Dispense: 360 tablet; Refill: 1    Diabetes type 2.  A1c up to 6.7%.  Increase metformin from 1 g in the morning to 1 g twice a day.  With severe diarrhea or other GI disturbance, he cut back on it.  He is going to work on diet and exercise.  I will see him back in 3 months.  If not improved I would consider offering a GLP-1 agonist.  Medicare wellness visit also next visit.    Hypertension.  Fair control.  Continue to monitor.  His weight is up.  I suspect with improved diet and exercise his blood pressure will come down.  Continue current medication.    Hyperlipidemia.  Continue rosuvastatin.         Follow Up     Return in about 3 months (around 9/24/2024) for Subsequent Medicare Wellness Visit, Lab Visit One Week Prior to Next Appointment.  Patient was given instructions and counseling regarding his condition or for health maintenance advice. Please see specific information pulled into the AVS if  appropriate.

## 2024-07-11 RX ORDER — AMLODIPINE BESYLATE AND BENAZEPRIL HYDROCHLORIDE 10; 20 MG/1; MG/1
CAPSULE ORAL
Qty: 90 CAPSULE | Refills: 1 | Status: SHIPPED | OUTPATIENT
Start: 2024-07-11

## 2024-07-11 NOTE — TELEPHONE ENCOUNTER
Rx Refill Note  Requested Prescriptions     Pending Prescriptions Disp Refills    amLODIPine-benazepril (LOTREL) 10-20 MG per capsule [Pharmacy Med Name: AMLODIPINE-BENAZEPRIL 10-20 MG] 90 capsule 1     Sig: TAKE 1 CAPSULE BY MOUTH EVERY DAY      Last office visit with prescribing clinician: Visit date not found   Last telemedicine visit with prescribing clinician: Visit date not found   Next office visit with prescribing clinician: Visit date not found                         Would you like a call back once the refill request has been completed: [] Yes [] No    If the office needs to give you a call back, can they leave a voicemail: [] Yes [] No    Lorin Wilson MA  07/11/24, 10:29 EDT

## 2024-08-28 ENCOUNTER — TELEPHONE (OUTPATIENT)
Dept: FAMILY MEDICINE CLINIC | Facility: CLINIC | Age: 69
End: 2024-08-28

## 2024-08-29 ENCOUNTER — OFFICE VISIT (OUTPATIENT)
Dept: FAMILY MEDICINE CLINIC | Facility: CLINIC | Age: 69
End: 2024-08-29
Payer: MEDICARE

## 2024-08-29 VITALS
HEART RATE: 95 BPM | WEIGHT: 260 LBS | BODY MASS INDEX: 33.37 KG/M2 | SYSTOLIC BLOOD PRESSURE: 117 MMHG | HEIGHT: 74 IN | OXYGEN SATURATION: 96 % | DIASTOLIC BLOOD PRESSURE: 72 MMHG

## 2024-08-29 DIAGNOSIS — U07.1 COVID-19: Primary | ICD-10-CM

## 2024-08-29 PROCEDURE — 3078F DIAST BP <80 MM HG: CPT | Performed by: NURSE PRACTITIONER

## 2024-08-29 PROCEDURE — 99213 OFFICE O/P EST LOW 20 MIN: CPT | Performed by: NURSE PRACTITIONER

## 2024-08-29 PROCEDURE — 3074F SYST BP LT 130 MM HG: CPT | Performed by: NURSE PRACTITIONER

## 2024-08-29 PROCEDURE — 1126F AMNT PAIN NOTED NONE PRSNT: CPT | Performed by: NURSE PRACTITIONER

## 2024-08-29 PROCEDURE — 3044F HG A1C LEVEL LT 7.0%: CPT | Performed by: NURSE PRACTITIONER

## 2024-08-30 NOTE — PROGRESS NOTES
"Chief Complaint  Covid Positive     Subjective        Daryl Decker presents to [facility]  History of Present Illness  History of Present Illness  The patient presents for evaluation of COVID-19.    He tested positive for COVID-19 yesterday morning. His symptoms began with a cough on Sunday, followed by the onset of a severe head cold on Monday. He also experienced hearing loss, which was not improved by his hearing aids. Suspecting earwax buildup, he attempted to clean his ears with Q-tips, resulting in bleeding from both ears. This was his first experience with ear bleeding. Despite the bleeding, he did not experience any pain or head injury. After testing positive for COVID-19, he attributed the ear issue to the virus. He used over-the-counter ear drops from [employer], which initially provided no relief. However, after using them this morning, he heard a fizzing sound and was able to clear his ears through lavage.    He felt fatigued yesterday but is unsure if he had a fever. He reports that he tires easily. He is currently taking hydrocortisone. He is not experiencing shortness of breath but has a history of wheezing since childhood. His cough is productive, yielding phlegm. He has never smoked. He has been managing his symptoms with Tussin DM and Sudafed Cold and Sinus Non-Drowsy 12 Hour, which seem to be effective.    FAMILY HISTORY  His father had lung cancer.    Objective   Vital Signs:  /72 (BP Location: Left arm, Patient Position: Sitting, Cuff Size: Large Adult)   Pulse 95   Ht 188 cm (74\")   Wt 118 kg (260 lb)   SpO2 96%   BMI 33.38 kg/m²   Estimated body mass index is 33.38 kg/m² as calculated from the following:    Height as of this encounter: 188 cm (74\").    Weight as of this encounter: 118 kg (260 lb).             Physical Exam  Vitals reviewed.   Constitutional:       Appearance: Normal appearance.   HENT:      Right Ear: Tympanic membrane normal.      " Left Ear: Tympanic membrane normal.      Ears:      Comments: Canal mildly erythematous bilaterally, no tenderness to palpate pinna or tragus       Nose: Nose normal.      Mouth/Throat:      Pharynx: Posterior oropharyngeal erythema present. No pharyngeal swelling or oropharyngeal exudate.   Neurological:      Mental Status: He is alert.       Physical Exam      Result Review :          Results  Laboratory Studies  Covid test positive. Kidney function and liver function are normal.    Assessment & Plan  1. COVID-19.  The patient's symptoms and recent positive COVID-19 test confirm the diagnosis. A prescription for Paxlovid, to be taken as 3 tablets twice daily for a duration of 5 days, has been issued to the [facility] on [address] in [facility]. He has been advised to discontinue rosuvastatin for a period of 2 weeks. The potential side effects of Paxlovid, including diarrhea and a metallic taste in the mouth, were discussed. He was also informed about the possibility of rebound COVID-19, which can occur in 5 to 10 percent of cases. If he remains fever-free for 24 hours without the aid of Tylenol or ibuprofen, he may resume his regular activities, provided he wears a mask for the next 10 days. His temperature will be checked today. Should his symptoms persist or worsen, a follow-up appointment with Dr. Echavarria will be scheduled.    2. Ear Bleeding.  The patient reported ear bleeding after attempting to clean his ears with Q-tips. Examination showed a slightly red ear canal but no infection. He was advised to avoid using Q-tips in the future and to monitor for any signs of infection or worsening symptoms.                   Assessment and Plan     Diagnoses and all orders for this visit:    1. COVID-19 (Primary)    Other orders  -     Nirmatrelvir & Ritonavir, 300mg/100mg, (PAXLOVID); Take 3 tablets by mouth 2 (Two) Times a Day.  Dispense: 30 each; Refill: 0             Follow Up     No follow-ups on file.  Patient was given  instructions and counseling regarding his condition or for health maintenance advice. Please see specific information pulled into the AVS if appropriate.       Patient or patient representative verbalized consent for the use of Ambient Listening during the visit with  KEYONA Jama for chart documentation. 8/30/2024  07:58 EDT

## 2024-09-25 ENCOUNTER — OFFICE VISIT (OUTPATIENT)
Dept: FAMILY MEDICINE CLINIC | Facility: CLINIC | Age: 69
End: 2024-09-25
Payer: MEDICARE

## 2024-09-25 VITALS
SYSTOLIC BLOOD PRESSURE: 104 MMHG | WEIGHT: 248 LBS | OXYGEN SATURATION: 96 % | DIASTOLIC BLOOD PRESSURE: 74 MMHG | BODY MASS INDEX: 31.83 KG/M2 | HEART RATE: 97 BPM | TEMPERATURE: 97.5 F | HEIGHT: 74 IN

## 2024-09-25 DIAGNOSIS — E78.00 PURE HYPERCHOLESTEROLEMIA: ICD-10-CM

## 2024-09-25 DIAGNOSIS — Z00.00 MEDICARE ANNUAL WELLNESS VISIT, SUBSEQUENT: Primary | ICD-10-CM

## 2024-09-25 DIAGNOSIS — Z23 NEED FOR IMMUNIZATION AGAINST INFLUENZA: ICD-10-CM

## 2024-09-25 DIAGNOSIS — I10 ESSENTIAL HYPERTENSION: ICD-10-CM

## 2024-09-25 DIAGNOSIS — Z85.46 HISTORY OF PROSTATE CANCER: Chronic | ICD-10-CM

## 2024-09-25 DIAGNOSIS — E11.9 TYPE 2 DIABETES MELLITUS WITHOUT COMPLICATION, WITHOUT LONG-TERM CURRENT USE OF INSULIN: ICD-10-CM

## 2024-09-25 PROCEDURE — 3078F DIAST BP <80 MM HG: CPT | Performed by: FAMILY MEDICINE

## 2024-09-25 PROCEDURE — G0008 ADMIN INFLUENZA VIRUS VAC: HCPCS | Performed by: FAMILY MEDICINE

## 2024-09-25 PROCEDURE — 90662 IIV NO PRSV INCREASED AG IM: CPT | Performed by: FAMILY MEDICINE

## 2024-09-25 PROCEDURE — 99214 OFFICE O/P EST MOD 30 MIN: CPT | Performed by: FAMILY MEDICINE

## 2024-09-25 PROCEDURE — G0439 PPPS, SUBSEQ VISIT: HCPCS | Performed by: FAMILY MEDICINE

## 2024-09-25 PROCEDURE — 3044F HG A1C LEVEL LT 7.0%: CPT | Performed by: FAMILY MEDICINE

## 2024-09-25 PROCEDURE — 3074F SYST BP LT 130 MM HG: CPT | Performed by: FAMILY MEDICINE

## 2024-09-25 PROCEDURE — 1126F AMNT PAIN NOTED NONE PRSNT: CPT | Performed by: FAMILY MEDICINE

## 2024-09-25 PROCEDURE — 1170F FXNL STATUS ASSESSED: CPT | Performed by: FAMILY MEDICINE

## 2024-09-25 RX ORDER — AZITHROMYCIN 250 MG/1
TABLET, FILM COATED ORAL
Qty: 6 TABLET | Refills: 0 | Status: SHIPPED | OUTPATIENT
Start: 2024-09-25

## 2024-11-18 RX ORDER — ROSUVASTATIN CALCIUM 10 MG/1
TABLET, COATED ORAL
Qty: 90 TABLET | Refills: 1 | Status: SHIPPED | OUTPATIENT
Start: 2024-11-18

## 2024-11-18 NOTE — TELEPHONE ENCOUNTER
Rx Refill Note  Requested Prescriptions     Pending Prescriptions Disp Refills    rosuvastatin (CRESTOR) 10 MG tablet [Pharmacy Med Name: ROSUVASTATIN CALCIUM 10 MG TAB] 90 tablet 1     Sig: TAKE 1 TABLET BY MOUTH EVERY DAY      Last office visit with prescribing clinician: 9/25/2024   Last telemedicine visit with prescribing clinician: Visit date not found   Next office visit with prescribing clinician: 3/25/2025                         Would you like a call back once the refill request has been completed: [] Yes [] No    If the office needs to give you a call back, can they leave a voicemail: [] Yes [] No    Pablito Robbins MA  11/18/24, 08:57 EST

## 2024-12-10 DIAGNOSIS — I10 ESSENTIAL HYPERTENSION: ICD-10-CM

## 2024-12-10 RX ORDER — HYDROCHLOROTHIAZIDE 25 MG/1
TABLET ORAL
Qty: 90 TABLET | Refills: 1 | Status: SHIPPED | OUTPATIENT
Start: 2024-12-10

## 2024-12-10 NOTE — TELEPHONE ENCOUNTER
Rx Refill Note  Requested Prescriptions     Pending Prescriptions Disp Refills    hydroCHLOROthiazide 25 MG tablet [Pharmacy Med Name: HYDROCHLOROTHIAZIDE 25 MG TAB] 90 tablet 1     Sig: TAKE 1 TABLET BY MOUTH EVERY DAY      Last office visit with prescribing clinician: 9/25/2024   Last telemedicine visit with prescribing clinician: Visit date not found   Next office visit with prescribing clinician: 3/25/2025                         Would you like a call back once the refill request has been completed: [] Yes [] No    If the office needs to give you a call back, can they leave a voicemail: [] Yes [] No    Ramya Hoffman MA  12/10/24, 08:01 EST

## 2025-02-21 ENCOUNTER — TELEPHONE (OUTPATIENT)
Dept: FAMILY MEDICINE CLINIC | Facility: CLINIC | Age: 70
End: 2025-02-21
Payer: MEDICARE

## 2025-02-21 NOTE — TELEPHONE ENCOUNTER
Hub relay     LVM for pt to call back to reschedule appt on 3/25/25 due to PCP being out of office.

## 2025-02-28 ENCOUNTER — TELEPHONE (OUTPATIENT)
Dept: FAMILY MEDICINE CLINIC | Facility: CLINIC | Age: 70
End: 2025-02-28

## 2025-02-28 NOTE — TELEPHONE ENCOUNTER
"    Hub staff attempted to follow warm transfer process and was unsuccessful     Caller: Daryl Decker \"Bill\"    Relationship to patient: Self    Best call back number:[phone]     Patient is needing: PATIENT IS CALLING TO RESCHEDULE THE LABS   "

## 2025-03-21 NOTE — TELEPHONE ENCOUNTER
Rx Refill Note  Requested Prescriptions     Pending Prescriptions Disp Refills    hydroCHLOROthiazide 25 MG tablet [Pharmacy Med Name: HYDROCHLOROTHIAZIDE 25 MG TAB] 90 tablet 1     Sig: TAKE 1 TABLET BY MOUTH EVERY DAY      Last office visit with prescribing clinician: 1/26/2024   Last telemedicine visit with prescribing clinician: Visit date not found   Next office visit with prescribing clinician: 6/24/2024                         Would you like a call back once the refill request has been completed: [] Yes [] No    If the office needs to give you a call back, can they leave a voicemail: [] Yes [] No    Cheo Miller  06/04/24, 08:19 EDT   Detail Level: Detailed Detail Level: Generalized

## 2025-03-28 ENCOUNTER — OFFICE VISIT (OUTPATIENT)
Dept: FAMILY MEDICINE CLINIC | Facility: CLINIC | Age: 70
End: 2025-03-28
Payer: MEDICARE

## 2025-03-28 VITALS
TEMPERATURE: 97.3 F | WEIGHT: 252 LBS | HEIGHT: 74 IN | DIASTOLIC BLOOD PRESSURE: 81 MMHG | HEART RATE: 94 BPM | OXYGEN SATURATION: 100 % | SYSTOLIC BLOOD PRESSURE: 124 MMHG | BODY MASS INDEX: 32.34 KG/M2

## 2025-03-28 DIAGNOSIS — E78.00 PURE HYPERCHOLESTEROLEMIA: Chronic | ICD-10-CM

## 2025-03-28 DIAGNOSIS — Z12.5 SCREENING PSA (PROSTATE SPECIFIC ANTIGEN): ICD-10-CM

## 2025-03-28 DIAGNOSIS — I10 ESSENTIAL HYPERTENSION: Chronic | ICD-10-CM

## 2025-03-28 DIAGNOSIS — E11.9 TYPE 2 DIABETES MELLITUS WITHOUT COMPLICATION, WITHOUT LONG-TERM CURRENT USE OF INSULIN: Primary | Chronic | ICD-10-CM

## 2025-03-28 NOTE — PROGRESS NOTES
"Chief Complaint  Hypertension    Subjective        Daryl Decker presents to [facility]    Diabetes type 2 follow-up.  Continues metformin.  He takes it in the morning only.  1000 mg.  He often forgets evening dose and it does cause some diarrhea.  A1c up to 6.8%.  He is intact season.  He drinks a lot of Cokes.  He has ongoing neuropathy symptoms but they are stable.  Mostly in the feet.    Hypertension.  Blood pressure is well-controlled.  Continues Lotrel and hydrochlorothiazide.    Hyperlipidemia.  Continues rosuvastatin.  Lipid panel overall unchanged.  Liver enzymes are normal.    Remote history of prostate cancer.  Followed by urology.    Objective   Vital Signs:  /81   Pulse 94   Temp 97.3 °F (36.3 °C) (Temporal)   Ht 188 cm (74\")   Wt 114 kg (252 lb)   SpO2 100%   BMI 32.35 kg/m²   Estimated body mass index is 32.35 kg/m² as calculated from the following:    Height as of this encounter: 188 cm (74\").    Weight as of this encounter: 114 kg (252 lb).            Physical Exam  Vitals and nursing note reviewed.   Constitutional:       General: He is not in acute distress.     Appearance: He is well-developed.   Cardiovascular:      Rate and Rhythm: Normal rate and regular rhythm.      Heart sounds: Normal heart sounds.   Pulmonary:      Effort: Pulmonary effort is normal.      Breath sounds: Normal breath sounds.   Skin:     General: Skin is warm and dry.   Psychiatric:         Mood and Affect: Mood normal.        Result Review :  The following data was reviewed by: Edson Lafleur MD on 03/28/2025:  Common labs          9/18/2024    08:28 9/24/2024    11:09 9/24/2024    11:22 3/19/2025    08:56   Common Labs   Glucose 116    120    BUN 19    10    Creatinine 1.21    1.04    Sodium 139    141    Potassium 4.9    4.0    Chloride 101    101    Calcium 9.5    9.3    Albumin 4.3    4.2    Total Bilirubin 0.4    0.5    Alkaline Phosphatase 75    73    AST (SGOT) 13    17  "   ALT (SGPT) 18    24    Total Cholesterol 111    140    Triglycerides 111    89    HDL Cholesterol 35    37    LDL Cholesterol  55    86    Hemoglobin A1C  6.50   6.80    Microalbumin, Urine   23.8  16.8                  Assessment and Plan   Diagnoses and all orders for this visit:    1. Type 2 diabetes mellitus without complication, without long-term current use of insulin (Primary)  -     Hemoglobin A1c; Future  -     CBC & Differential; Future  -     Comprehensive Metabolic Panel; Future  -     Lipid Panel; Future    2. Essential hypertension  -     Hemoglobin A1c; Future  -     CBC & Differential; Future  -     Comprehensive Metabolic Panel; Future  -     Lipid Panel; Future    3. Pure hypercholesterolemia  -     Hemoglobin A1c; Future  -     CBC & Differential; Future  -     Comprehensive Metabolic Panel; Future  -     Lipid Panel; Future    4. Screening PSA (prostate specific antigen)  -     PSA Screen; Future      Diabetes type 2.  Slight increase in A1c.  We discussed a GLP-1 agonist.  He wants to work on diet and exercise.  I will see him back in 6 months for Medicare wellness visit.  Lab work prior.    Hypertension.  Well-controlled.    Hyperlipidemia.  Continue rosuvastatin.    Remote prostate cancer.  Followed by urology.  I will be checking a PSA prior to next visit.       Follow Up   No follow-ups on file.  Patient was given instructions and counseling regarding his condition or for health maintenance advice. Please see specific information pulled into the AVS if appropriate.

## 2025-04-04 RX ORDER — AMLODIPINE AND BENAZEPRIL HYDROCHLORIDE 10; 20 MG/1; MG/1
1 CAPSULE ORAL DAILY
Qty: 90 CAPSULE | Refills: 1 | Status: SHIPPED | OUTPATIENT
Start: 2025-04-04

## 2025-04-04 NOTE — TELEPHONE ENCOUNTER
Rx Refill Note  Requested Prescriptions     Pending Prescriptions Disp Refills    amLODIPine-benazepril (LOTREL) 10-20 MG per capsule [Pharmacy Med Name: AMLODIPINE-BENAZEPRIL 10-20 MG] 90 capsule 1     Sig: TAKE 1 CAPSULE BY MOUTH EVERY DAY      Last office visit with prescribing clinician: 3/28/2025   Last telemedicine visit with prescribing clinician: Visit date not found   Next office visit with prescribing clinician: 9/30/2025                         Would you like a call back once the refill request has been completed: [] Yes [] No    If the office needs to give you a call back, can they leave a voicemail: [] Yes [] No    Cheo Miller  04/04/25, 08:43 EDT

## 2025-07-21 NOTE — PROGRESS NOTES
SUMMARY (A/P):    62-year-old gentleman with relatively small minimally symptomatic umbilical hernia.  He has the option of surgical repair versus expectant observation.  This is entering a busy time of work for him and he'll decide sometime in the late spring whether he wants to have this done.  We did discuss the nature the procedure and the risks including but not limited to bleeding, infection, use of mesh, and recurrence.      CC:  Referred for consultation regarding umbilical hernia by Dr. Lafleur    HPI:  62-year-old gentleman with several month history of mild periumbilical discomfort associated with visible and palpable bulge that enlarges with activity.    PHYSICAL EXAM:   Constitutional: Well-developed well-nourished, no acute distress   Heart rate 79   Weight (pounds) 275   BMI 35.3   Height (inches) 74  Eyes: Conjunctiva normal, sclera nonicteric  ENMT: Hearing grossly normal, oral mucosa moist  Neck: Supple, no palpable mass, normal thyroid, trachea midline  Respiratory: Clear to auscultation, normal inspiratory effort  Cardiovascular: Regular rate, no murmur, no carotid bruit, no peripheral edema, no jugular venous distention  Gastrointestinal: Soft, nontender, no palpable mass, no hepatosplenomegaly, small to moderate size easily reducible umbilical hernia, bowel sounds normal  Lymphatics (palpable nodes):  cervical-negative   Skin:  Warm, dry, no rash on visualized skin surfaces  Musculoskeletal: Symmetric strength, normal gait  Psychiatric: Alert and oriented ×3, normal affect     ALLERGIES: reviewed, in Epic    MEDICATIONS: reviewed, in Epic    PMH:    Prostate cancer  Hypertension    PSH:    Hydrocele excision    FAMILY HISTORY:    Negative for colon cancer    SOCIAL HISTORY:   Denies tobacco use  Occasional alcohol use    ROS:  No chest pain or shortness of air.  All other systems reviewed and negative other than presenting complaints.    VON COTE M.D.    
Home

## 2025-07-31 ENCOUNTER — OFFICE VISIT (OUTPATIENT)
Dept: ORTHOPEDIC SURGERY | Facility: CLINIC | Age: 70
End: 2025-07-31
Payer: MEDICARE

## 2025-07-31 VITALS — BODY MASS INDEX: 33.41 KG/M2 | WEIGHT: 260.3 LBS | HEIGHT: 74 IN | TEMPERATURE: 98.4 F

## 2025-07-31 DIAGNOSIS — M17.0 PRIMARY OSTEOARTHRITIS OF BOTH KNEES: Primary | ICD-10-CM

## 2025-07-31 DIAGNOSIS — G89.29 CHRONIC PAIN OF BOTH KNEES: ICD-10-CM

## 2025-07-31 DIAGNOSIS — M25.561 CHRONIC PAIN OF BOTH KNEES: ICD-10-CM

## 2025-07-31 DIAGNOSIS — M25.562 CHRONIC PAIN OF BOTH KNEES: ICD-10-CM

## 2025-07-31 NOTE — PROGRESS NOTES
[facility]              New Problem      Patient Name: Daryl Dceker  : 1955  Primary Care Physician: Edson Lafleur MD        Chief Complaint:  Bilateral knee pain    HPI:   Daryl Decker is a 69 y.o. year old who presents today for evaluation.  History of Present Illness  The patient is a 69-year-old male who presents for evaluation of bilateral knee pain.    He has been experiencing bilateral knee pain for approximately 2 to 3 years, with the left knee being more affected than the right. The pain is primarily located in the same area in both knees and intensifies when he walks extensively or sleeps with one knee on top of the other. Despite the pain, he continues to bear weight on his knees. About a month ago, he experienced an incident with his right knee that resulted in pain behind the knee. He recalls a sensation similar to his hamstring popping while standing, which was followed by pain extending down to the bottom of his foot. The pain in his right knee is intermittent, sometimes severe and at other times completely absent.     He reports occasional pain in his groin and hips. He has noticed a change in the angle of his leg bones above the knees and a tendency for his foot to turn outwards when he walks. He works half the year, spending 40 to 60 hours a week sitting at a desk from . He felt a loss of strength in his legs after this period last year. He typically can walk 2 miles easily, but 4 miles leaves him winded.  He uses a alfredo chair at work, which he finds more comfortable than his previous chair. He has the flexibility to get up and walk around at work. He had a small exercise machine, but he got rid of it because he was not using it. He also has an [employer], but he was not using that either as it was uncomfortable. He occasionally rides a bicycle and finds bicycling easier on his knees than walking. He has been managing the pain with Blue-Emu and two Tylenol tablets in the  morning, which provide relief throughout the day. However, he does not take Tylenol daily.    PAST SURGICAL HISTORY:  He has a history of a meniscus tear in the mid-1990s, which was addressed arthroscopically.     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  Exercise: Bicycling and walking are the primary forms of exercise. Generally does a lot of walking and can walk easily 2 miles.    FAMILY HISTORY  - Mother: Both knees replaced.  - Baby sister: Both knees replaced in late 50s, Alzheimer's.    Past Medical/Surgical, Social and Family History:  I have reviewed and/or updated pertinent history as noted in the medical record including:  Past Medical History:   Diagnosis Date    Allergic     Hay fever    Ankle sprain 60 yrs ago    Clotting disorder     Diabetes mellitus 2021    Diet working    Fracture, clavicle 2011    Healed    Fracture, finger 50 yrs ago    H/O hydrocele 11/12/2015    HL (hearing loss) 2018    Check?    Hypertension     Peripheral neuropathy     Prostate cancer 2013    Dr Bacon - Rad [facility]    Tear of meniscus of knee 1995     Past Surgical History:   Procedure Laterality Date    COLONOSCOPY N/A 8/19/2020    Procedure: COLONOSCOPY TO CECUM;  Surgeon: Senthil Mcduffie MD;  Location: [facility] ENDOSCOPY;  Service: General;  Laterality: N/A;  SCREENING  --DIVERTICULOSIS     ENDOSCOPY N/A 8/19/2020    Procedure: ESOPHAGOGASTRODUODENOSCOPY;  Surgeon: Senthil Mcduffie MD;  Location: [facility] ENDOSCOPY;  Service: General;  Laterality: N/A;  DYSPHAGIA  --SLIDING HIATAL HERNIA     HYDROCELECTOMY Right 11/12/2015    W/ scrotal exploration-Dr. Luis Alfredo Bacon    KNEE ARTHROSCOPY  1990s    WISDOM TOOTH EXTRACTION       Social History     Occupational History    Not on file   Tobacco Use    Smoking status: Never    Smokeless tobacco: Never    Tobacco comments:     None   Vaping Use    Vaping status: Never Used   Substance and Sexual Activity    Alcohol use: Yes     Alcohol/week:  4.0 standard drinks of alcohol     Types: 1 Glasses of wine, 3 Cans of beer per week     Comment: 5 drinks per week    Drug use: No    Sexual activity: Yes     Partners: Female     Birth control/protection: Condom          Allergies: No Known Allergies    Medications:   Home Medications:  Current Outpatient Medications on File Prior to Visit   Medication Sig    amLODIPine-benazepril (LOTREL) 10-20 MG per capsule TAKE 1 CAPSULE BY MOUTH EVERY DAY    hydroCHLOROthiazide 25 MG tablet TAKE 1 TABLET BY MOUTH EVERY DAY    metFORMIN ER (GLUCOPHAGE-XR) 500 MG 24 hr tablet Take 2 tablets by mouth Daily With Breakfast & Dinner. (Patient taking differently: Take 2 tablets by mouth Daily With Breakfast & Dinner. 2 tablets in the am and 2 tablets in the evening)    rosuvastatin (CRESTOR) 10 MG tablet TAKE 1 TABLET BY MOUTH EVERY DAY     No current facility-administered medications on file prior to visit.         ROS:  ROS negative except as listed in the HPI.    Physical Exam:   69 y.o. male  Body mass index is 33.42 kg/m²., 118 kg (260 lb 4.8 oz)  Vitals:    07/31/25 0951   Temp: 98.4 °F (36.9 °C)     General: Alert, cooperative, appears well and in no observable distress. Appears stated age and BMI as listed above.  HEENT: Normocephalic, atraumatic on external visual inspection.  CV: No significant peripheral edema.  Respiratory: Normal respiratory effort.  Skin: Warm & well perfused; appropriate skin turgor.  Psych: Appropriate mood & affect.  Neuro: Gross sensation and motor intact in affected extremity/extremities.  Vascular: Peripheral pulses palpable in affected extremity/extremities.   Physical Exam      MSK Exam:  Bilateral Knee  No deformity or wounds appreciated. No significant redness or warmth. Varus alignment L>R.  Trace effusion noted  Tenderness along the joint line appreciated medial compartment  ROM 0-120 with pain at terminal motion. +crepitus  Ligamentous exam grossly stable  Quad strength 4-4+/5    Brief hip  exam in the affected extremity(ies) grossly unremarkable.  Moves ankle and toes up and down, no significant pain or swelling in the foot, ankle or calf.        Radiology:    The following X-rays were ordered/reviewed today to evaluate the patient's symptoms: Bilateral Knee: AP standing, lateral, and sunrise of both knees show tricompartmental degenerative changes with more medial compartmental narrowing and varus alignment L>R. There are osteophytes noted about the joints and patellofemoral changes are noted as well. No prior films are available for comparison.  Results  Imaging   - X-ray of the right knee: Joint space narrowing and bone spurs indicating arthritis    Procedure:   N/A      Misc. Data/Labs: N/A    Assessment & Plan:      ICD-10-CM ICD-9-CM   1. Primary osteoarthritis of both knees  M17.0 715.16   2. Chronic pain of both knees  M25.561 719.46    M25.562 338.29    G89.29      No orders of the defined types were placed in this encounter.    Orders Placed This Encounter   Procedures    XR Knee 3 View Bilateral    Ambulatory Referral to Physical Therapy for Evaluation & Treatment       Assessment & Plan  1. Bilateral knee pain:  Bilateral knee pain has been present for the past couple of years, with recent exacerbation in the right knee accompanied by pain behind the knee and occasional popping sensations. X-rays reveal joint space narrowing and bone spurs, indicative of arthritis.    Management includes continuing the use of Blue-Emu and Tylenol as needed, with a recommendation to limit Tylenol intake to 3000 mg per day. Voltaren gel, a topical NSAID, is suggested as a safer alternative to oral NSAIDS. Physical therapy is recommended to strengthen the quadriceps, core, and hips to alleviate joint stress. Steroid injections are an option if current pain management becomes insufficient. Stretching exercises should be incorporated into the daily routine, especially during periods of prolonged sitting. A  handout detailing these exercises will be provided. Consideration of a standing desk or alternating between sitting and standing throughout the day is advised to prevent stiffness and maintain mobility.    Continue with activity modifications as needed/discussed.  Continue ICE and/or HEAT PRN.  Recommend to continue activity as tolerated, focus on quad and hip/core strengthening as well as gentle stretching.  Recommend lowering or maintaining BMI within a healthy range to reduce symptoms.   Patient encouraged to call with questions or concerns prior to follow up.  Will discuss with attending as needed.   Consider additional referrals, work up and/or advanced imaging as indicated or if patient fails to respond to conservative care.    Return if symptoms worsen or fail to improve.    Patient or patient representative verbalized consent for the use of Ambient Listening during the visit with  KEYONA Villanueva for chart documentation. 8/1/2025  13:11 EDT        KEYONA Ngo    Dictation software was used to complete a portion or all of this note.

## 2025-08-28 ENCOUNTER — TREATMENT (OUTPATIENT)
Dept: PHYSICAL THERAPY | Facility: CLINIC | Age: 70
End: 2025-08-28
Payer: MEDICARE

## 2025-08-28 DIAGNOSIS — M25.561 CHRONIC PAIN OF BOTH KNEES: ICD-10-CM

## 2025-08-28 DIAGNOSIS — M25.562 CHRONIC PAIN OF BOTH KNEES: ICD-10-CM

## 2025-08-28 DIAGNOSIS — M25.651 HIP JOINT STIFFNESS, BILATERAL: ICD-10-CM

## 2025-08-28 DIAGNOSIS — M25.652 HIP JOINT STIFFNESS, BILATERAL: ICD-10-CM

## 2025-08-28 DIAGNOSIS — G89.29 CHRONIC PAIN OF BOTH KNEES: ICD-10-CM

## 2025-08-28 DIAGNOSIS — M25.561 ACUTE PAIN OF RIGHT KNEE: Primary | ICD-10-CM

## (undated) DEVICE — BITEBLOCK OMNI BLOC

## (undated) DEVICE — KT ORCA ORCAPOD DISP STRL

## (undated) DEVICE — ADAPT CLN BIOGUARD AIR/H2O DISP

## (undated) DEVICE — THE TORRENT IRRIGATION SCOPE CONNECTOR IS USED WITH THE TORRENT IRRIGATION TUBING TO PROVIDE IRRIGATION FLUIDS SUCH AS STERILE WATER DURING GASTROINTESTINAL ENDOSCOPIC PROCEDURES WHEN USED IN CONJUNCTION WITH AN IRRIGATION PUMP (OR ELECTROSURGICAL UNIT).: Brand: TORRENT

## (undated) DEVICE — MSK ENDO PORT O2 POM ELITE CURAPLEX A/

## (undated) DEVICE — SENSR O2 OXIMAX FNGR A/ 18IN NONSTR

## (undated) DEVICE — TUBING, SUCTION, 1/4" X 10', STRAIGHT: Brand: MEDLINE

## (undated) DEVICE — LN SMPL CO2 SHTRM SD STREAM W/M LUER